# Patient Record
Sex: FEMALE | Race: WHITE | NOT HISPANIC OR LATINO | Employment: FULL TIME | URBAN - METROPOLITAN AREA
[De-identification: names, ages, dates, MRNs, and addresses within clinical notes are randomized per-mention and may not be internally consistent; named-entity substitution may affect disease eponyms.]

---

## 2018-05-30 ENCOUNTER — NON-PROVIDER VISIT (OUTPATIENT)
Dept: OCCUPATIONAL MEDICINE | Facility: CLINIC | Age: 33
End: 2018-05-30

## 2018-05-30 DIAGNOSIS — Z11.1 ENCOUNTER FOR PPD TEST: ICD-10-CM

## 2018-05-30 PROCEDURE — 86580 TB INTRADERMAL TEST: CPT | Performed by: PREVENTIVE MEDICINE

## 2018-06-02 ENCOUNTER — NON-PROVIDER VISIT (OUTPATIENT)
Dept: URGENT CARE | Facility: CLINIC | Age: 33
End: 2018-06-02

## 2018-06-02 LAB — TB WHEAL 3D P 5 TU DIAM: NORMAL MM

## 2018-06-04 ENCOUNTER — OFFICE VISIT (OUTPATIENT)
Dept: OCCUPATIONAL MEDICINE | Facility: CLINIC | Age: 33
End: 2018-06-04

## 2018-06-04 VITALS
HEIGHT: 63 IN | OXYGEN SATURATION: 98 % | DIASTOLIC BLOOD PRESSURE: 68 MMHG | TEMPERATURE: 97.9 F | RESPIRATION RATE: 14 BRPM | WEIGHT: 116 LBS | BODY MASS INDEX: 20.55 KG/M2 | HEART RATE: 72 BPM | SYSTOLIC BLOOD PRESSURE: 100 MMHG

## 2018-06-04 DIAGNOSIS — Z02.89 VISIT FOR OCCUPATIONAL HEALTH EXAMINATION: ICD-10-CM

## 2018-06-04 PROCEDURE — 8915 PR COMPREHENSIVE PHYSICAL: Performed by: PREVENTIVE MEDICINE

## 2018-06-26 ENCOUNTER — NON-PROVIDER VISIT (OUTPATIENT)
Dept: OCCUPATIONAL MEDICINE | Facility: CLINIC | Age: 33
End: 2018-06-26

## 2018-06-26 DIAGNOSIS — Z23 NEED FOR HEPATITIS B VACCINATION: ICD-10-CM

## 2018-06-26 PROCEDURE — 90746 HEPB VACCINE 3 DOSE ADULT IM: CPT | Performed by: PREVENTIVE MEDICINE

## 2019-02-11 ENCOUNTER — TELEPHONE (OUTPATIENT)
Dept: SCHEDULING | Facility: IMAGING CENTER | Age: 34
End: 2019-02-11

## 2019-02-11 ENCOUNTER — OFFICE VISIT (OUTPATIENT)
Dept: INTERNAL MEDICINE | Facility: MEDICAL CENTER | Age: 34
End: 2019-02-11
Payer: COMMERCIAL

## 2019-02-11 VITALS
SYSTOLIC BLOOD PRESSURE: 100 MMHG | HEIGHT: 63 IN | WEIGHT: 129.38 LBS | DIASTOLIC BLOOD PRESSURE: 66 MMHG | BODY MASS INDEX: 22.93 KG/M2 | TEMPERATURE: 98.5 F | HEART RATE: 69 BPM | OXYGEN SATURATION: 97 %

## 2019-02-11 DIAGNOSIS — G89.29 CHRONIC PELVIC PAIN IN FEMALE: ICD-10-CM

## 2019-02-11 DIAGNOSIS — N30.90 CYSTITIS: ICD-10-CM

## 2019-02-11 DIAGNOSIS — R10.2 CHRONIC PELVIC PAIN IN FEMALE: ICD-10-CM

## 2019-02-11 DIAGNOSIS — Z86.19 HISTORY OF HERPES GENITALIS: ICD-10-CM

## 2019-02-11 DIAGNOSIS — R21 SKIN RASH: ICD-10-CM

## 2019-02-11 PROCEDURE — 99204 OFFICE O/P NEW MOD 45 MIN: CPT | Mod: GC | Performed by: INTERNAL MEDICINE

## 2019-02-11 RX ORDER — ACYCLOVIR 400 MG/1
400 TABLET ORAL 3 TIMES DAILY
Qty: 21 TAB | Refills: 1 | Status: SHIPPED | OUTPATIENT
Start: 2019-02-11 | End: 2019-02-11

## 2019-02-11 RX ORDER — ACYCLOVIR 400 MG/1
400 TABLET ORAL 3 TIMES DAILY
Qty: 21 TAB | Refills: 1 | Status: SHIPPED | OUTPATIENT
Start: 2019-02-11 | End: 2019-02-18

## 2019-02-11 RX ORDER — PERMETHRIN 50 MG/G
30 CREAM TOPICAL ONCE
Qty: 1 TUBE | Refills: 1 | Status: SHIPPED | OUTPATIENT
Start: 2019-02-11 | End: 2019-02-11

## 2019-02-11 ASSESSMENT — PATIENT HEALTH QUESTIONNAIRE - PHQ9: CLINICAL INTERPRETATION OF PHQ2 SCORE: 0

## 2019-02-11 NOTE — LETTER
[facility]  Shana Douglas M.D.  [address] [facility] [address]  [place] [address]  Fax: [phone]   Authorization for Release/Disclosure of   Protected Health Information   Name: NEENA VAIL : 1985 SSN: [id number]   Address: [address] [place] [address], [address] [address] Phone:    [phone] (home)    I authorize the entity listed below to release/disclose the PHI below to:   [facility]/Shana Douglas M.D. and Shana Douglas M.D.   Provider or Entity Name: [facility]     Address                             [address] [place] [address], [place], [place]   Phone:      Fax:     Reason for request: continuity of care   Information to be released:    [  ] LAST COLONOSCOPY,  including any PATH REPORT and follow-up  [  ] LAST FIT/COLOGUARD RESULT [  ] LAST DEXA  [  ] LAST MAMMOGRAM  [ X ] LAST PAP  [  ] LAST LABS [  ] RETINA EXAM REPORT  [  ] IMMUNIZATION RECORDS  [  ] Release all info      [  ] Check here and initial the line next to each item to release ALL health information INCLUDING  _____ Care and treatment for drug and / or alcohol abuse  _____ HIV testing, infection status, or AIDS  _____ Genetic Testing    DATES OF SERVICE OR TIME PERIOD TO BE DISCLOSED: _____________  I understand and acknowledge that:  * This Authorization may be revoked at any time by you in writing, except if your health information has already been used or disclosed.  * Your health information that will be used or disclosed as a result of you signing this authorization could be re-disclosed by the recipient. If this occurs, your re-disclosed health information may no longer be protected by State or Federal laws.  * You may refuse to sign this Authorization. Your refusal will not affect your ability to obtain treatment.  * This Authorization becomes effective upon signing and will  on (date) __________.      If no date is indicated, this Authorization will  one (1) year from the signature date.       Name: Madelyn Jessica    Signature:   Date:     2/11/2019       PLEASE FAX REQUESTED RECORDS BACK TO: [phone]

## 2019-02-11 NOTE — LETTER
[facility]  Shana Douglas M.D.  [address] [facility] [address]  [place] [address]  Fax: [phone]   Authorization for Release/Disclosure of   Protected Health Information   Name: NEENA VAIL : 1985 SSN: [id number]   Address: [address] [place] [address] Phone:    [phone] (home)    I authorize the entity listed below to release/disclose the PHI below to:   [facility]/Shana Douglas M.D. and Shana Douglas M.D.   Provider or Entity Name: [facility]   City, State, [facility]   Phone:      Fax:     Reason for request: continuity of care   Information to be released:    [  ] LAST COLONOSCOPY,  including any PATH REPORT and follow-up  [  ] LAST FIT/COLOGUARD RESULT [  ] LAST DEXA  [  ] LAST MAMMOGRAM  [  ] LAST PAP  [  ] LAST LABS [  ] RETINA EXAM REPORT  [  ] IMMUNIZATION RECORDS  [ X ] Release all info      [  ] Check here and initial the line next to each item to release ALL health information INCLUDING  _____ Care and treatment for drug and / or alcohol abuse  _____ HIV testing, infection status, or AIDS  _____ Genetic Testing    DATES OF SERVICE OR TIME PERIOD TO BE DISCLOSED: _____________  I understand and acknowledge that:  * This Authorization may be revoked at any time by you in writing, except if your health information has already been used or disclosed.  * Your health information that will be used or disclosed as a result of you signing this authorization could be re-disclosed by the recipient. If this occurs, your re-disclosed health information may no longer be protected by State or Federal laws.  * You may refuse to sign this Authorization. Your refusal will not affect your ability to obtain treatment.  * This Authorization becomes effective upon signing and will  on (date) __________.      If no date is indicated, this Authorization will  one (1) year from the signature date.    Name: Neena Vail    Signature:   Date:      2/11/2019       PLEASE FAX REQUESTED RECORDS BACK TO: [phone]

## 2019-02-12 NOTE — PROGRESS NOTES
New Patient to Establish    Reason to establish: New patient to establish    CC:   Chief Complaint   Patient presents with   • New Patient   • Other     Pt has been exposed Scabies, MRSA, and contact dermtaologist.   • Rash     Pt not sure if she has contact dematitis.   • Itching   • Patient Question     Per pt has had Herpes 1 & 2. Not sure if she needs to be on Medicine       HPI:     33-year-old female came in for above.  Her previous PCP at [facility].    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  The facility went through the protocol to clean scabies and she received prophylaxis for her exposure.  She is really diligent with PPE.  However, she noticed itchiness and red dots on her left forearm and on her belly 2 weeks ago.  Those were intermittently itchy, not specifically during the night.  She has sensitive skin but denies any change in her personal product use.  She is taking hydroxyzine for itchiness. Her facility thinks that she's having allergies or contact dermatitis. She lives with a boyfriend.     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  She got STD screening including chlamydia, gonorrhea, HIV, HSV, [facility] smear recently at [facility], said to be negative except for HSV 1 and 2 which came back positive.  She is wondering if she needs prophylactic treatment for HSV infection.  She had a history of breakouts where she has sensitivity and ? Ulcer which went away on its own. Breakouts happens every 6 months.    She has history of ovarian cyst rupture and had laparoscopic removal.  Since then, she has chronic pelvic pain, urinary frequency, her GYN thought that she possibly has interstitial cystitis or endometriosis.  Her menstrual periods are regular.  She normally urinates 20 times a day, but currently denies dysuria, fever or chills. She has loose  BMs 1-2 times a day. No weight loss.  In fact, she gained about 30 pounds.    ROS:   All other systems reviewed, negative except as stated above.    Patient Active Problem List    Diagnosis Date Noted   • history of interstitial Cystitis 02/11/2019   • Chronic pelvic pain in female 02/11/2019   • History of herpes genitalis 02/11/2019       Past Medical History:   Diagnosis Date   • Bronchitis    • COPD     chronic bronchitis   • Infectious disease 2007    MRSA abcesses   • Other specified symptom associated with female genital organs     bacterial infections   • Pain     jaw   • Ruptured ovarian cyst 4/23/2014       Current Outpatient Prescriptions   Medication Sig Dispense Refill   • permethrin (ELIMITE) 5 % Cream Apply 30 g to affected area(s) Once for 1 dose. 1 Tube 1   • acyclovir (ZOVIRAX) 400 MG tablet Take 1 Tab by mouth 3 times a day for 7 days. At the first sign of herpes break out (burning / sensitivity / vesicles) 21 Tab 1   • hydrOXYzine (ATARAX) 25 MG Tab Take 25 mg by mouth 3 times a day as needed for Itching.       No current facility-administered medications for this visit.        Allergies as of 02/11/2019 - Reviewed 02/11/2019   Allergen Reaction Noted   • Flagyl [kdc:metronidazole+tartrazine] Vomiting 12/29/2015   • Vicodin [hydrocodone-acetaminophen]  01/25/2011       Social History     Social History   • Marital status: Single     Spouse name: N/A   • Number of children: N/A   • Years of education: N/A     Occupational History   • Not on file.     Social History Main Topics   • Smoking status: Former Smoker     Packs/day: 0.50     Years: 11.00     Types: Cigarettes     Quit date: 01/2017   • Smokeless tobacco: Never Used      Comment: Pt vaps now   • Alcohol use No   • Drug use: No      Comment: speed and marijuana, 3 years ago   • Sexual activity: Yes     Other Topics Concern   • Not on file     Social History Narrative   • No narrative on file       Family History   Problem Relation Age of  "Onset   • Thyroid Mother         graves   • Heart Disease Father         CHF, valve problem   • Dementia Father    • Alcohol/Drug Father    • Lung Disease Father         COPD   • No Known Problems Sister    • No Known Problems Brother        Past Surgical History:   Procedure Laterality Date   • PELVISCOPY  4/23/2014    Performed by Eileen Cleveland M.D. at SURGERY SAME DAY [facility] ORS   • MANDIBLE FRACTURE ORIF  2/7/2011    Performed by SABI BURT JR at SURGERY SAME DAY [facility] ORS         /66 (BP Location: Left arm, Patient Position: Sitting, BP Cuff Size: Adult)   Pulse 69   Temp 36.9 °C (98.5 °F) (Temporal)   Ht 1.594 m (5' 2.75\")   Wt 58.7 kg (129 lb 6 oz)   SpO2 97%   BMI 23.10 kg/m²     Physical Exam  General: Alert and oriented, No apparent distress.  Eyes: Pupils are equal and reactive. No scleral icterus.  Throat: Clear no erythema or exudates noted.  Neck: Supple. No lymphadenopathy noted. Thyroid not enlarged.  Lungs: Clear to auscultation bilaterally without any wheezing, crepitations.  Cardiovascular: Regular rate and rhythm. No murmurs, rubs or gallops.  Abdomen: Bowel sound +, soft, non tender, no rebound or guarding, no palpable organomegaly  Extremities: No clubbing, cyanosis, edema.  Skin: tiny red bumps on left forearm and abdomen. Unable to see if there is clear opening on top.   Neuro: A & O x 3. Normal speech and memory. Motor and sensory grossly normal.     Note: I have reviewed all pertinent labs and diagnostic tests associated with this visit with specific comments listed under the assessment and plan below    Assessment and Plan    1. Skin rash  tiny red bumps on left forearm and abdomen. Unable to see if there is clear opening on top. There is no erythematous patch. It is not consistent with contact dermatitis. Scabies cannot be ruled out.   - will empirically treat for scabies with permethrin (ELIMITE) 5 % Cream, instruction given for the cream and to wash clothes " and balance at home with hot water and dry with hot dryer. Recommended treatment for boyfriend too, but lack of insurance for him is limiting factor.   - if there is no improvement with permethrin cream, we will look for other causes of her skin rash. Recommended to maintain moisture of skin to prevent itchiness from drying / sensitivities.    2. History of herpes genitalis  Explained that we will treat for the break out symptoms. Since her breakouts are infrequent, she does not need prophylactic treatment.  - acyclovir (ZOVIRAX) 400 MG tablet; Take 1 Tab by mouth 3 times a day for 7 days. At the first sign of herpes break out (burning / sensitivity / vesicles)  Dispense: 21 Tab; Refill: 1    3. history of interstitial Cystitis  - will try to get record from previous gyne if it's still there since she last saw her 8 years ago (Dr Winsome GAY). She declined dietary management (avoidance of spicy food) in the past.   - check URINALYSIS; Future  - CBC WITH DIFFERENTIAL; Future  - Comp Metabolic Panel; Future      4. Chronic pelvic pain in female  - manageable right now since she has it for prolonged period.     PAP - this year at [facility], request record.  Tdap - 2017    Followup: Return in about 5 weeks (around 3/18/2019).      Signed by: Shana Douglas M.D.

## 2019-03-11 ENCOUNTER — HOSPITAL ENCOUNTER (OUTPATIENT)
Dept: LAB | Facility: MEDICAL CENTER | Age: 34
End: 2019-03-11
Attending: INTERNAL MEDICINE
Payer: COMMERCIAL

## 2019-03-11 DIAGNOSIS — N30.90 CYSTITIS: ICD-10-CM

## 2019-03-11 DIAGNOSIS — R21 SKIN RASH: ICD-10-CM

## 2019-03-11 LAB
ALBUMIN SERPL BCP-MCNC: 4.9 G/DL (ref 3.2–4.9)
ALBUMIN/GLOB SERPL: 1.5 G/DL
ALP SERPL-CCNC: 96 U/L (ref 30–99)
ALT SERPL-CCNC: 23 U/L (ref 2–50)
ANION GAP SERPL CALC-SCNC: 11 MMOL/L (ref 0–11.9)
APPEARANCE UR: CLEAR
AST SERPL-CCNC: 21 U/L (ref 12–45)
BACTERIA #/AREA URNS HPF: ABNORMAL /HPF
BASOPHILS # BLD AUTO: 0.3 % (ref 0–1.8)
BASOPHILS # BLD: 0.02 K/UL (ref 0–0.12)
BILIRUB SERPL-MCNC: 0.5 MG/DL (ref 0.1–1.5)
BILIRUB UR QL STRIP.AUTO: NEGATIVE
BUN SERPL-MCNC: 18 MG/DL (ref 8–22)
CALCIUM SERPL-MCNC: 9.6 MG/DL (ref 8.5–10.5)
CHLORIDE SERPL-SCNC: 103 MMOL/L (ref 96–112)
CO2 SERPL-SCNC: 24 MMOL/L (ref 20–33)
COLOR UR: YELLOW
CREAT SERPL-MCNC: 0.61 MG/DL (ref 0.5–1.4)
EOSINOPHIL # BLD AUTO: 0.14 K/UL (ref 0–0.51)
EOSINOPHIL NFR BLD: 1.9 % (ref 0–6.9)
EPI CELLS #/AREA URNS HPF: ABNORMAL /HPF
ERYTHROCYTE [DISTWIDTH] IN BLOOD BY AUTOMATED COUNT: 43 FL (ref 35.9–50)
GLOBULIN SER CALC-MCNC: 3.3 G/DL (ref 1.9–3.5)
GLUCOSE SERPL-MCNC: 69 MG/DL (ref 65–99)
GLUCOSE UR STRIP.AUTO-MCNC: NEGATIVE MG/DL
HCT VFR BLD AUTO: 44.1 % (ref 37–47)
HGB BLD-MCNC: 14.7 G/DL (ref 12–16)
HYALINE CASTS #/AREA URNS LPF: ABNORMAL /LPF
IMM GRANULOCYTES # BLD AUTO: 0.03 K/UL (ref 0–0.11)
IMM GRANULOCYTES NFR BLD AUTO: 0.4 % (ref 0–0.9)
KETONES UR STRIP.AUTO-MCNC: NEGATIVE MG/DL
LEUKOCYTE ESTERASE UR QL STRIP.AUTO: ABNORMAL
LYMPHOCYTES # BLD AUTO: 2.84 K/UL (ref 1–4.8)
LYMPHOCYTES NFR BLD: 39.5 % (ref 22–41)
MCH RBC QN AUTO: 30 PG (ref 27–33)
MCHC RBC AUTO-ENTMCNC: 33.3 G/DL (ref 33.6–35)
MCV RBC AUTO: 90 FL (ref 81.4–97.8)
MICRO URNS: ABNORMAL
MONOCYTES # BLD AUTO: 0.33 K/UL (ref 0–0.85)
MONOCYTES NFR BLD AUTO: 4.6 % (ref 0–13.4)
NEUTROPHILS # BLD AUTO: 3.83 K/UL (ref 2–7.15)
NEUTROPHILS NFR BLD: 53.3 % (ref 44–72)
NITRITE UR QL STRIP.AUTO: NEGATIVE
NRBC # BLD AUTO: 0 K/UL
NRBC BLD-RTO: 0 /100 WBC
PH UR STRIP.AUTO: 6.5 [PH]
PLATELET # BLD AUTO: 337 K/UL (ref 164–446)
PMV BLD AUTO: 10.4 FL (ref 9–12.9)
POTASSIUM SERPL-SCNC: 3.6 MMOL/L (ref 3.6–5.5)
PROT SERPL-MCNC: 8.2 G/DL (ref 6–8.2)
PROT UR QL STRIP: NEGATIVE MG/DL
RBC # BLD AUTO: 4.9 M/UL (ref 4.2–5.4)
RBC # URNS HPF: ABNORMAL /HPF
RBC UR QL AUTO: ABNORMAL
SODIUM SERPL-SCNC: 138 MMOL/L (ref 135–145)
SP GR UR STRIP.AUTO: 1.01
UROBILINOGEN UR STRIP.AUTO-MCNC: 0.2 MG/DL
WBC # BLD AUTO: 7.2 K/UL (ref 4.8–10.8)
WBC #/AREA URNS HPF: ABNORMAL /HPF

## 2019-03-11 PROCEDURE — 81001 URINALYSIS AUTO W/SCOPE: CPT

## 2019-03-11 PROCEDURE — 80053 COMPREHEN METABOLIC PANEL: CPT

## 2019-03-11 PROCEDURE — 85025 COMPLETE CBC W/AUTO DIFF WBC: CPT

## 2019-03-11 PROCEDURE — 36415 COLL VENOUS BLD VENIPUNCTURE: CPT

## 2019-03-12 DIAGNOSIS — R35.0 URINARY FREQUENCY: ICD-10-CM

## 2019-03-18 ENCOUNTER — HOSPITAL ENCOUNTER (OUTPATIENT)
Dept: LAB | Facility: MEDICAL CENTER | Age: 34
End: 2019-03-18
Attending: INTERNAL MEDICINE
Payer: COMMERCIAL

## 2019-03-18 ENCOUNTER — OFFICE VISIT (OUTPATIENT)
Dept: INTERNAL MEDICINE | Facility: MEDICAL CENTER | Age: 34
End: 2019-03-18
Payer: COMMERCIAL

## 2019-03-18 VITALS
HEIGHT: 63 IN | BODY MASS INDEX: 23.21 KG/M2 | HEART RATE: 73 BPM | TEMPERATURE: 97.9 F | WEIGHT: 131 LBS | OXYGEN SATURATION: 97 % | SYSTOLIC BLOOD PRESSURE: 114 MMHG | DIASTOLIC BLOOD PRESSURE: 76 MMHG

## 2019-03-18 DIAGNOSIS — N30.90 CYSTITIS: ICD-10-CM

## 2019-03-18 DIAGNOSIS — R35.0 URINARY FREQUENCY: ICD-10-CM

## 2019-03-18 DIAGNOSIS — R10.2 CHRONIC PELVIC PAIN IN FEMALE: ICD-10-CM

## 2019-03-18 DIAGNOSIS — G89.29 CHRONIC PELVIC PAIN IN FEMALE: ICD-10-CM

## 2019-03-18 LAB
APPEARANCE UR: CLEAR
BILIRUB UR QL STRIP.AUTO: NEGATIVE
COLOR UR: YELLOW
GLUCOSE UR STRIP.AUTO-MCNC: NEGATIVE MG/DL
KETONES UR STRIP.AUTO-MCNC: NEGATIVE MG/DL
LEUKOCYTE ESTERASE UR QL STRIP.AUTO: NEGATIVE
MICRO URNS: NORMAL
NITRITE UR QL STRIP.AUTO: NEGATIVE
PH UR STRIP.AUTO: 6 [PH]
PROT UR QL STRIP: NEGATIVE MG/DL
RBC UR QL AUTO: NEGATIVE
SP GR UR STRIP.AUTO: 1.02
UROBILINOGEN UR STRIP.AUTO-MCNC: 0.2 MG/DL

## 2019-03-18 PROCEDURE — 99213 OFFICE O/P EST LOW 20 MIN: CPT | Mod: GE | Performed by: INTERNAL MEDICINE

## 2019-03-18 PROCEDURE — 81003 URINALYSIS AUTO W/O SCOPE: CPT

## 2019-03-18 RX ORDER — PERMETHRIN 50 MG/G
CREAM TOPICAL
Refills: 1 | COMMUNITY
Start: 2019-02-11 | End: 2019-03-18

## 2019-03-18 RX ORDER — ACYCLOVIR 400 MG/1
TABLET ORAL
COMMUNITY
Start: 2019-03-13 | End: 2020-04-28

## 2019-03-18 NOTE — LETTER
[facility]  Shana Douglas M.D.  [address] [facility] [address]  [place] [address]  Fax: [phone]   Authorization for Release/Disclosure of   Protected Health Information   Name: NEENA VAIL : 1985 SSN: [id number]   Address: [address] [place] [address] [place] [address] Phone:    [phone] (home)    I authorize the entity listed below to release/disclose the PHI below to:   [facility]/Shana Douglas M.D. and Shana Douglas M.D.   Provider or Entity Name:                                                 Dr. Eileen ISLAS     Address   [facility]   Phone:      Fax:     Reason for request: continuity of care   Information to be released:    [  ] LAST COLONOSCOPY,  including any PATH REPORT and follow-up  [  ] LAST FIT/COLOGUARD RESULT [  ] LAST DEXA  [  ] LAST MAMMOGRAM  [  ] LAST PAP  [  ] LAST LABS [  ] RETINA EXAM REPORT  [  ] IMMUNIZATION RECORDS  [X] Release all info      [  ] Check here and initial the line next to each item to release ALL health information INCLUDING  _____ Care and treatment for drug and / or alcohol abuse  _____ HIV testing, infection status, or AIDS  _____ Genetic Testing    DATES OF SERVICE OR TIME PERIOD TO BE DISCLOSED: _____________  I understand and acknowledge that:  * This Authorization may be revoked at any time by you in writing, except if your health information has already been used or disclosed.  * Your health information that will be used or disclosed as a result of you signing this authorization could be re-disclosed by the recipient. If this occurs, your re-disclosed health information may no longer be protected by State or Federal laws.  * You may refuse to sign this Authorization. Your refusal will not affect your ability to obtain treatment.  * This Authorization becomes effective upon signing and will  on (date) __________.      If no date is indicated, this Authorization will  one (1) year from the signature date.    Name: Neena  Chyna Jessica    Signature:   Date:     3/18/2019       PLEASE FAX REQUESTED RECORDS BACK TO: [phone]

## 2019-03-18 NOTE — LETTER
[facility]  Shana Douglas M.D.  [address] [facility] [address]  [place] [address]  Fax: [phone]   Authorization for Release/Disclosure of   Protected Health Information   Name: NEENA VAIL : 1985 SSN: [id number]   Address: [address] [place] [address] Phone:    [phone] (home)    I authorize the entity listed below to release/disclose the PHI below to:   [facility]/Shana Douglas M.D. and Shana Douglas M.D.   Provider or Entity Name:                                                      [facility]     Address   City, State, Zip   Phone:      Fax:     Reason for request: continuity of care   Information to be released:    [  ] LAST COLONOSCOPY,  including any PATH REPORT and follow-up  [  ] LAST FIT/COLOGUARD RESULT [  ] LAST DEXA  [  ] LAST MAMMOGRAM  [X] LAST PAP  [  ] LAST LABS [  ] RETINA EXAM REPORT  [  ] IMMUNIZATION RECORDS  [X] Release all info      [  ] Check here and initial the line next to each item to release ALL health information INCLUDING  _____ Care and treatment for drug and / or alcohol abuse  _____ HIV testing, infection status, or AIDS  _____ Genetic Testing    DATES OF SERVICE OR TIME PERIOD TO BE DISCLOSED: _____________  I understand and acknowledge that:  * This Authorization may be revoked at any time by you in writing, except if your health information has already been used or disclosed.  * Your health information that will be used or disclosed as a result of you signing this authorization could be re-disclosed by the recipient. If this occurs, your re-disclosed health information may no longer be protected by State or Federal laws.  * You may refuse to sign this Authorization. Your refusal will not affect your ability to obtain treatment.  * This Authorization becomes effective upon signing and will  on (date) __________.      If no date is indicated, this Authorization will  one (1) year from the signature date.    Name: Neena Clemente  Aracely    Signature:   Date:     3/18/2019       PLEASE FAX REQUESTED RECORDS BACK TO: [phone]

## 2019-03-18 NOTE — PROGRESS NOTES
"    Established Patient    Madelyn presents today with the following:    CC:   Chief Complaint   Patient presents with   • Labs Only     Lab Review       HPI:   33-year-old female came in for follow-up for lab review and chronic medical problems.    She was seen here for possible scabies with intense skin itchiness, resolved after applying permethrin.     She continues to have urinary frequency without dysuria, flank pain, fever or chills.   This has been ongoing along with chronic pelvic pain since her right ovarian cystectomy.  She denies any gross hematuria, but was diagnosed with interstitial cystitis clinically by her gynecologist, Dr. Cleveland.  Her pain is exacerbated by certain food such as spicy food and caffeine, also gets worse with bowel movement occasionally.   She is taking ibuprofen for pain with improvement.  This is not daily pain, but can fluctuate quite a bit from 0-8.   Her menstrual periods are regular, once a month, flow varies from scant to heavy.     She reportedly had a transabdominal and transvaginal ultrasound last year, said to be normal except for urinary retention after she tried to empty the bladder completely.      ROS:   All other systems reviewed, negative except as stated above.    Patient Active Problem List    Diagnosis Date Noted   • Urinary frequency 03/18/2019   • history of interstitial Cystitis 02/11/2019   • Chronic pelvic pain in female 02/11/2019   • History of herpes genitalis 02/11/2019       Current Outpatient Prescriptions   Medication Sig Dispense Refill   • acyclovir (ZOVIRAX) 400 MG tablet      • hydrOXYzine (ATARAX) 25 MG Tab Take 25 mg by mouth 3 times a day as needed for Itching.       No current facility-administered medications for this visit.          /76 (BP Location: Right arm, Patient Position: Sitting, BP Cuff Size: Adult)   Pulse 73   Temp 36.6 °C (97.9 °F) (Temporal)   Ht 1.594 m (5' 2.75\")   Wt 59.4 kg (131 lb)   LMP 03/04/2019   SpO2 97%   " Breastfeeding? No   BMI 23.39 kg/m²     Physical Exam  General: Alert and oriented, No apparent distress.  Eyes: Pupils are equal and reactive. No scleral icterus.  Throat: Clear no erythema or exudates noted.  Neck: Supple. No lymphadenopathy noted. Thyroid not enlarged.  Lungs: Clear to auscultation bilaterally without any wheezing, crepitations.  Cardiovascular: Regular rate and rhythm. No murmurs, rubs or gallops.  Abdomen: Bowel sound +, soft, non tender, no rebound or guarding, no palpable organomegaly  Extremities: No clubbing, cyanosis, edema.  Skin: No rash or suspicious skin lesions noted.  Neuro: A & O x 3. Normal speech and memory. Motor and sensory grossly normal.     Note: I have reviewed all pertinent labs and diagnostic tests associated with this visit with specific comments listed under the assessment and plan below    Assessment and Plan    1. Chronic pelvic pain in female  We will request US pelvic result. Since it's said to be normal, this is likely related to her previous ovarian surgery and scarring causing pain. There is a possibility she may have pelvic floor muscle dysfunction and she may benefit from pelvic floor physical therapy.   - She is willing to try physical therapy for pelvic floor muscle.  - REFERRAL TO PHYSICAL THERAPY Reason for Therapy: Eval/Treat/Report  -She is going to make an appointment with her GYN for menstrual problem.  She will let me know if she needs a referral. She is not on contraception with her bf since she is willing to conceive.    2. history of interstitial Cystitis  3. Urinary frequency  Last UA showed small Blood without any significant RBCs, trace leukocyte esterase, epithelial cells, bacteria, hyaline cast.  This is likely contaminated.  She denies any change in symptoms.  Her urinary frequency has been chronic for years.  - REFERRAL TO UROLOGY for urinary frequency disturbing with her daily lifestyle and urinary retention.    [facility] at [facility], requested record.  Tdap - 2017    Return in about 6 months (around 9/18/2019).    Signed by: Shana Douglas M.D.

## 2019-09-29 ENCOUNTER — HOSPITAL ENCOUNTER (EMERGENCY)
Facility: MEDICAL CENTER | Age: 34
End: 2019-09-29
Attending: EMERGENCY MEDICINE
Payer: COMMERCIAL

## 2019-09-29 VITALS
WEIGHT: 131.84 LBS | TEMPERATURE: 97.9 F | RESPIRATION RATE: 18 BRPM | SYSTOLIC BLOOD PRESSURE: 117 MMHG | OXYGEN SATURATION: 98 % | DIASTOLIC BLOOD PRESSURE: 81 MMHG | HEIGHT: 63 IN | HEART RATE: 68 BPM | BODY MASS INDEX: 23.36 KG/M2

## 2019-09-29 DIAGNOSIS — G89.18 POST-OPERATIVE PAIN: ICD-10-CM

## 2019-09-29 DIAGNOSIS — K08.89 PAIN, DENTAL: ICD-10-CM

## 2019-09-29 PROCEDURE — A9270 NON-COVERED ITEM OR SERVICE: HCPCS | Performed by: EMERGENCY MEDICINE

## 2019-09-29 PROCEDURE — 700102 HCHG RX REV CODE 250 W/ 637 OVERRIDE(OP): Performed by: EMERGENCY MEDICINE

## 2019-09-29 PROCEDURE — 99283 EMERGENCY DEPT VISIT LOW MDM: CPT

## 2019-09-29 RX ORDER — IBUPROFEN 800 MG/1
800 TABLET ORAL EVERY 8 HOURS PRN
COMMUNITY
End: 2020-04-28

## 2019-09-29 RX ORDER — OXYCODONE HYDROCHLORIDE AND ACETAMINOPHEN 5; 325 MG/1; MG/1
1 TABLET ORAL EVERY 6 HOURS PRN
Qty: 12 TAB | Refills: 0 | Status: SHIPPED | OUTPATIENT
Start: 2019-09-29 | End: 2019-10-03

## 2019-09-29 RX ORDER — CLINDAMYCIN HYDROCHLORIDE 300 MG/1
300 CAPSULE ORAL 4 TIMES DAILY
Qty: 8 CAP | Refills: 0 | Status: SHIPPED | OUTPATIENT
Start: 2019-09-29 | End: 2019-10-01

## 2019-09-29 RX ORDER — CLINDAMYCIN HYDROCHLORIDE 150 MG/1
150 CAPSULE ORAL 4 TIMES DAILY
COMMUNITY
End: 2019-09-29 | Stop reason: SDUPTHER

## 2019-09-29 RX ORDER — CLINDAMYCIN HYDROCHLORIDE 150 MG/1
150 CAPSULE ORAL 4 TIMES DAILY
Qty: 8 CAP | Refills: 0 | Status: SHIPPED | OUTPATIENT
Start: 2019-09-29 | End: 2020-04-28

## 2019-09-29 RX ORDER — OXYCODONE HYDROCHLORIDE AND ACETAMINOPHEN 5; 325 MG/1; MG/1
1 TABLET ORAL ONCE
Status: COMPLETED | OUTPATIENT
Start: 2019-09-29 | End: 2019-09-29

## 2019-09-29 RX ADMIN — OXYCODONE HYDROCHLORIDE AND ACETAMINOPHEN 1 TABLET: 5; 325 TABLET ORAL at 10:21

## 2019-09-29 ASSESSMENT — ENCOUNTER SYMPTOMS
COUGH: 0
HEADACHES: 1
VOMITING: 0
FEVER: 0

## 2019-09-29 NOTE — ED TRIAGE NOTES
"Chief Complaint   Patient presents with   • Dental Pain     had 4 teeth pulled on monday. went back to dentist on Friday and they said it was healing fine. is still having LT sided dental pain. taking clindamycin.      Pt to triage for above. Also taking 800mg ibuprofen, last taken at 0200.  Denies fevers.    Pt returned to lobby. Educated on triage process and to inform staff of any changes.     /88   Pulse 67   Temp 36.6 °C (97.9 °F) (Temporal)   Resp 16   Ht 1.6 m (5' 3\")   Wt 59.8 kg (131 lb 13.4 oz)   SpO2 100%   BMI 23.35 kg/m²     "

## 2019-09-29 NOTE — ED NOTES
Pt discharged with instructions and script  X 2 .  Pt verbalizes the understanding of instructions. Pt ambulated out of ER without any difficulty.

## 2019-09-29 NOTE — ED PROVIDER NOTES
"ED Provider Note    Scribed for Meaghan Adan D.O. by Esperanza Roblero. 9/29/2019, 9:46 AM.    Primary care provider: Shana Douglas M.D.  Means of arrival: Walk-In  History obtained from: Patient  History limited by: None     CHIEF COMPLAINT  Chief Complaint   Patient presents with   • Dental Pain     had 4 teeth pulled on monday. went back to dentist on Friday and they said it was healing fine. is still having LT sided dental pain. taking clindamycin.      HPI  Madelyn Jessica is a 34 y.o. female who presents to the Emergency Department for worsening bottom left dental pain onset last Monday after having 4 teeth removed, 2 on the top and 2 on the bottom. The patient states that on the day she had the extraction, she had no pain but the pain started the night after. She called her dentist, was seen by him on Friday, there was some concern for infection at the time of the tooth extraction and she was prescribed clindamycin to prevent infection which the patient started taking 3-4 days ago. The patient continued to have pain therefore followed-up with her dentist on Friday where they stated she was healing fine and she did not a have a dry socket. She describes her dental pain as \"having an ear infection and Strep throat at the same time.\" Dental pain is exacerbated by breathing in cold air. She is not taking pain medications but she is using 800 mg Ibuprofen, as prescribed by her dentist, to manage her pain however she states that this medication is not working for her. Additionally, patient endorses a pounding/throbbing headache. Denies fever.    REVIEW OF SYSTEMS  Review of Systems   Constitutional: Negative for fever.   HENT: Negative for congestion.         Dental pain   Respiratory: Negative for cough.    Gastrointestinal: Negative for vomiting.   Neurological: Positive for headaches.     PAST MEDICAL HISTORY   has a past medical history of Bronchitis, COPD, Infectious disease (2007), Other specified " "symptom associated with female genital organs, Pain, and Ruptured ovarian cyst (2014).    SURGICAL HISTORY   has a past surgical history that includes mandible fracture orif (2011) and pelviscopy (2014).    SOCIAL HISTORY  Social History     Tobacco Use   • Smoking status: Former Smoker     Packs/day: 0.50     Years: 11.00     Pack years: 5.50     Types: Cigarettes     Last attempt to quit: 2017     Years since quittin.7   • Smokeless tobacco: Never Used   • Tobacco comment: Pt vaps now   Substance Use Topics   • Alcohol use: No   • Drug use: No     Types: Intravenous, Inhaled     Comment: speed and marijuana, 3 years ago      Social History     Substance and Sexual Activity   Drug Use No   • Types: Intravenous, Inhaled    Comment: speed and marijuana, 3 years ago       FAMILY HISTORY  Family History   Problem Relation Age of Onset   • Thyroid Mother         graves   • Heart Disease Father         CHF, valve problem   • Dementia Father    • Alcohol/Drug Father    • Lung Disease Father         COPD   • No Known Problems Sister    • No Known Problems Brother        CURRENT MEDICATIONS  Home Medications     Reviewed by Dave Rodriguez R.N. (Registered Nurse) on 19 at 0908  Med List Status: Partial   Medication Last Dose Status   acyclovir (ZOVIRAX) 400 MG tablet  Active   clindamycin (CLEOCIN) 150 MG Cap 2019 Active   hydrOXYzine (ATARAX) 25 MG Tab  Active   ibuprofen (MOTRIN) 800 MG Tab 2019 Active                ALLERGIES  Allergies   Allergen Reactions   • Flagyl [Kdc:Metronidazole+Tartrazine] Vomiting   • Vicodin [Hydrocodone-Acetaminophen]      \"I feel nauseated.\"       PHYSICAL EXAM  VITAL SIGNS: /88   Pulse 67   Temp 36.6 °C (97.9 °F) (Temporal)   Resp 16   Ht 1.6 m (5' 3\")   Wt 59.8 kg (131 lb 13.4 oz)   SpO2 100%   BMI 23.35 kg/m²   Vitals reviewed.    Constitutional: Patient is oriented to person, place, and time. Appears well-developed and well-nourished. Mild " distress.    Head: Normocephalic and atraumatic.   Ears: Tympanic membranes normal bilaterally.   Mouth/Throat: Oropharynx is clear and moist, no exudates. No swelling to the floor of her mouth. Left bottom molars have been  removed. No gum swelling or erythema over the area in question. Tender to palpation to this region. Manages own secretions.  Eyes: Conjunctivae are normal.   Cardiovascular: Normal rate, regular rhythm and normal heart sounds. Normal peripheral pulses.  Pulmonary/Chest: Effort normal and breath sounds normal. No respiratory distress, no wheezes, rhonchi, or rales.  Lymphadenopathy: No cervical adenopathy.   Skin: Skin is warm and dry. No erythema. No pallor.   Psychiatric: Patient has a normal mood and affect.      COURSE & MEDICAL DECISION MAKING  Pertinent Labs & Imaging studies reviewed. (See chart for details)    Obtained and reviewed past medical records from 3/19 for follow up labs at [facility]. Her last ED visit was for cellulitis.     9:46 AM - Patient seen and examined at bedside.  Patient presents with bottom dental pain after tooth extraction on Monday.  The area does not appear to be inflamed.  There is no evidence of abscess post extraction.  The gums are not erythematous but she does have diffuse tenderness to this area.  She is on clindamycin and I recommended 2 additional days she will be provided a prescription for this.  Patient will be treated with Percocet 325 mg. I informed the patient that there is no further intervention necessary in the ED today. I informed the patient to use a warm compress to alleviate the pain. She verbalized her understanding and agrees with discharge.  Patient is advised to follow-up with her dentist this week.  She will be prescribed a short course of Percocet for home as Motrin is not managing her pain.    I reviewed prescription monitoring program for patient's narcotic use before prescribing a scheduled drug.The patient will not drink alcohol  nor drive with prescribed medications. The patient will return for new or worsening symptoms and is stable at the time of discharge.    The patient is referred to a primary physician for blood pressure management, diabetic screening, and for all other preventative health concerns.    In prescribing controlled substances to this patient, I certify that I have obtained and reviewed the medical history of Madelyn Jessica. I have also made a good maury effort to obtain applicable records from other providers who have treated the patient and    I have conducted a physical exam and documented it. I have reviewed Ms. Jessica’s prescription history as maintained by the [place] Prescription Monitoring Program.     I have assessed the patient’s risk for abuse, dependency, and addiction using the validated Opioid Risk Tool available at https://www.mdcalc.com/jejxza-zthh-uulj-ort-narcotic-abuse.     DISPOSITION:  Patient will be discharged home in stable condition.    FOLLOW UP:  [facility], Emergency Dept  [address] [id number] [phone]    If symptoms worsen      please see your dentist on Monday or Tuesdau this week          OUTPATIENT MEDICATIONS:  Discharge Medication List as of 9/29/2019 10:23 AM      START taking these medications    Details   oxyCODONE-acetaminophen (PERCOCET) 5-325 MG Tab Take 1 Tab by mouth every 6 hours as needed for Moderate Pain or Severe Pain for up to 12 doses., Disp-12 Tab, R-0, Print Rx Paper, For 12 doses               FINAL IMPRESSION  1. Pain, dental    2. Post-operative pain          Esperanza MONTEZ ([place]), am scribing for, and in the presence of, Meaghan Adan D.O..    Electronically signed by: Esperanza Roblero ([place]), 9/29/2019    Meaghan MONTEZ D.O. personally performed the services described in this documentation, as scribed by Esperanza Roblero in my presence, and it is both accurate and complete. E.     The note  accurately reflects work and decisions made by me.  Meaghan Adan  9/29/2019  2:30 PM

## 2020-02-11 ENCOUNTER — HOSPITAL ENCOUNTER (EMERGENCY)
Dept: HOSPITAL 8 - ED | Age: 35
Discharge: HOME | End: 2020-02-11
Payer: COMMERCIAL

## 2020-02-11 VITALS — BODY MASS INDEX: 25.23 KG/M2 | HEIGHT: 63 IN | WEIGHT: 142.42 LBS

## 2020-02-11 VITALS — DIASTOLIC BLOOD PRESSURE: 81 MMHG | SYSTOLIC BLOOD PRESSURE: 131 MMHG

## 2020-02-11 DIAGNOSIS — Z87.891: ICD-10-CM

## 2020-02-11 DIAGNOSIS — R10.2: ICD-10-CM

## 2020-02-11 DIAGNOSIS — O03.4: Primary | ICD-10-CM

## 2020-02-11 LAB
ALBUMIN SERPL-MCNC: 4.4 G/DL (ref 3.4–5)
ALP SERPL-CCNC: 106 U/L (ref 45–117)
ALT SERPL-CCNC: 36 U/L (ref 12–78)
ANION GAP SERPL CALC-SCNC: 8 MMOL/L (ref 5–15)
BASOPHILS # BLD AUTO: 0.03 X10^3/UL (ref 0–0.1)
BASOPHILS NFR BLD AUTO: 0 % (ref 0–1)
BILIRUB SERPL-MCNC: 0.1 MG/DL (ref 0.2–1)
CALCIUM SERPL-MCNC: 9.1 MG/DL (ref 8.5–10.1)
CHLORIDE SERPL-SCNC: 110 MMOL/L (ref 98–107)
CREAT SERPL-MCNC: 0.57 MG/DL (ref 0.55–1.02)
CULTURE INDICATED?: NO
EOSINOPHIL # BLD AUTO: 0.23 X10^3/UL (ref 0–0.4)
EOSINOPHIL NFR BLD AUTO: 2 % (ref 1–7)
ERYTHROCYTE [DISTWIDTH] IN BLOOD BY AUTOMATED COUNT: 13.5 % (ref 9.6–15.2)
LYMPHOCYTES # BLD AUTO: 2.84 X10^3/UL (ref 1–3.4)
LYMPHOCYTES NFR BLD AUTO: 29 % (ref 22–44)
MCH RBC QN AUTO: 30.4 PG (ref 27–34.8)
MCHC RBC AUTO-ENTMCNC: 33.9 G/DL (ref 32.4–35.8)
MCV RBC AUTO: 89.9 FL (ref 80–100)
MD: NO
MICROSCOPIC: (no result)
MONOCYTES # BLD AUTO: 0.7 X10^3/UL (ref 0.2–0.8)
MONOCYTES NFR BLD AUTO: 7 % (ref 2–9)
NEUTROPHILS # BLD AUTO: 5.9 X10^3/UL (ref 1.8–6.8)
NEUTROPHILS NFR BLD AUTO: 61 % (ref 42–75)
PLATELET # BLD AUTO: 410 X10^3/UL (ref 130–400)
PMV BLD AUTO: 7.5 FL (ref 7.4–10.4)
PROT SERPL-MCNC: 8.6 G/DL (ref 6.4–8.2)
RBC # BLD AUTO: 4.76 X10^6/UL (ref 3.82–5.3)

## 2020-02-11 PROCEDURE — 36415 COLL VENOUS BLD VENIPUNCTURE: CPT

## 2020-02-11 PROCEDURE — 99284 EMERGENCY DEPT VISIT MOD MDM: CPT

## 2020-02-11 PROCEDURE — 85025 COMPLETE CBC W/AUTO DIFF WBC: CPT

## 2020-02-11 PROCEDURE — 76801 OB US < 14 WKS SINGLE FETUS: CPT

## 2020-02-11 PROCEDURE — 86901 BLOOD TYPING SEROLOGIC RH(D): CPT

## 2020-02-11 PROCEDURE — 84702 CHORIONIC GONADOTROPIN TEST: CPT

## 2020-02-11 PROCEDURE — 81001 URINALYSIS AUTO W/SCOPE: CPT

## 2020-02-11 PROCEDURE — 80053 COMPREHEN METABOLIC PANEL: CPT

## 2020-04-28 ENCOUNTER — INITIAL PRENATAL (OUTPATIENT)
Dept: OBGYN | Facility: CLINIC | Age: 35
End: 2020-04-28

## 2020-04-28 ENCOUNTER — HOSPITAL ENCOUNTER (OUTPATIENT)
Dept: LAB | Facility: MEDICAL CENTER | Age: 35
End: 2020-04-28
Attending: NURSE PRACTITIONER

## 2020-04-28 VITALS
HEIGHT: 63 IN | WEIGHT: 141.1 LBS | DIASTOLIC BLOOD PRESSURE: 56 MMHG | SYSTOLIC BLOOD PRESSURE: 110 MMHG | BODY MASS INDEX: 25 KG/M2

## 2020-04-28 DIAGNOSIS — N93.8 DUB (DYSFUNCTIONAL UTERINE BLEEDING): ICD-10-CM

## 2020-04-28 DIAGNOSIS — N93.8 DUB (DYSFUNCTIONAL UTERINE BLEEDING): Primary | ICD-10-CM

## 2020-04-28 PROBLEM — R35.0 URINARY FREQUENCY: Status: RESOLVED | Noted: 2019-03-18 | Resolved: 2020-04-28

## 2020-04-28 LAB — B-HCG SERPL-ACNC: 203 MIU/ML (ref 0–5)

## 2020-04-28 PROCEDURE — 36415 COLL VENOUS BLD VENIPUNCTURE: CPT

## 2020-04-28 PROCEDURE — 84702 CHORIONIC GONADOTROPIN TEST: CPT

## 2020-04-28 PROCEDURE — 99203 OFFICE O/P NEW LOW 30 MIN: CPT | Performed by: NURSE PRACTITIONER

## 2020-04-28 ASSESSMENT — ENCOUNTER SYMPTOMS
NAUSEA: 1
VOMITING: 1
CONSTIPATION: 1

## 2020-04-28 ASSESSMENT — FIBROSIS 4 INDEX: FIB4 SCORE: 0.43

## 2020-04-28 NOTE — PATIENT INSTRUCTIONS
P:  1.  GC/CT and pap @ [facility].         2.  Prenatal labs @ [facility].        3.  Discussed PNV, diet, and adequate water intake        4.  [facility] packet given        5.  Return to office in 2wks w MD for  scan.          6.  Beta quants ordered.        7.  SAB precautions given.        8.  D/w pt helps for NV, constipation - handouts given.

## 2020-04-28 NOTE — PROGRESS NOTES
today  LMP03/25/2020  PNV Yes started two days ago  Phone #[phone]  Pharmacy verified with patient  C/o  Itching in vaginal area.

## 2020-04-28 NOTE — PROGRESS NOTES
"Subjective:   Madelyn Fernandez is a 35 y.o.   @ EGA: 4w6d CLARKE: Estimated Date of Delivery: 20  per MALACHI who presents for her DUB visit  She c/o NV, also some constipation.  Reports no FM, VB, LOF, or cramping.  Denies dysuria, vaginal DC.  Pt is  and lives with . Not presently working at this time.  Pregnancy is unplanned but wanted.       Gynecologic Exam (DUB)            HPI    Review of Systems   Gastrointestinal: Positive for constipation, nausea and vomiting.   All other systems reviewed and are negative.         Objective:     /56   Ht 1.6 m (5' 3\")   Wt 64 kg (141 lb 1.6 oz)   LMP 2020 (Exact Date)   BMI 24.99 kg/m²     See H&P Prenatal Physical.          FHTs: unable to auscultate due to early GA        Fundal ht: 5     Physical Exam  Vitals signs and nursing note reviewed.   Constitutional:       Appearance: She is well-developed and normal weight.   HENT:      Head: Normocephalic and atraumatic.   Neck:      Musculoskeletal: Normal range of motion and neck supple.      Thyroid: No thyromegaly.   Cardiovascular:      Rate and Rhythm: Normal rate and regular rhythm.      Pulses: Normal pulses.      Heart sounds: Normal heart sounds.   Pulmonary:      Effort: Pulmonary effort is normal.      Breath sounds: Normal breath sounds.   Chest:      Comments: Deferred  Abdominal:      General: Abdomen is flat. Bowel sounds are normal.      Palpations: Abdomen is soft.   Genitourinary:     Comments: Deferred  Musculoskeletal: Normal range of motion.   Skin:     General: Skin is warm and dry.      Capillary Refill: Capillary refill takes less than 2 seconds.   Neurological:      Mental Status: She is alert and oriented to person, place, and time.   Psychiatric:         Mood and Affect: Mood normal.         Behavior: Behavior normal.         Thought Content: Thought content normal.         Judgment: Judgment normal.               A:   1.  IUP @ 4w6d CLARKE: " Estimated Date of Delivery: 12/30/20 per LNMP         2.  S=D        3.    Patient Active Problem List    Diagnosis Date Noted   • DUB (dysfunctional uterine bleeding) 04/28/2020   • history of interstitial Cystitis 02/11/2019   • Chronic pelvic pain in female 02/11/2019   • History of herpes genitalis 02/11/2019         P:  1.  GC/CT and pap @ [facility].         2.  Prenatal labs @ [facility].        3.  Discussed PNV, diet, and adequate water intake        4.  NOB packet given        5.  Return to office in 2wks w MD for  scan.          6.  Beta quants ordered.        7.  SAB precautions given.        8.  D/w pt helps for NV, constipation - handouts given.

## 2020-04-29 ENCOUNTER — TELEPHONE (OUTPATIENT)
Dept: OBGYN | Facility: CLINIC | Age: 35
End: 2020-04-29

## 2020-04-29 NOTE — TELEPHONE ENCOUNTER
----- Message from FILIBERTO Jacobson sent at 4/29/2020  8:49 AM PDT -----  Please insure pt does 2nd beta quant.    4/29/20 1531 Spoke with pt and notified, she will go to lab tomorrow

## 2020-04-29 NOTE — TELEPHONE ENCOUNTER
Pt was calling regarding beta quants. I did inform she needs to do the 2nd quant on Thursday to determine if it is a normal or abnormal pregnancy. Pt asked if we can give her hormones to increase the beat quants. I did explain that they will go up naturally. Pt understood, no further questions and will call on Friday.

## 2020-04-30 ENCOUNTER — HOSPITAL ENCOUNTER (OUTPATIENT)
Dept: LAB | Facility: MEDICAL CENTER | Age: 35
End: 2020-04-30
Attending: NURSE PRACTITIONER

## 2020-04-30 DIAGNOSIS — N93.8 DUB (DYSFUNCTIONAL UTERINE BLEEDING): ICD-10-CM

## 2020-04-30 PROCEDURE — 84702 CHORIONIC GONADOTROPIN TEST: CPT

## 2020-04-30 PROCEDURE — 36415 COLL VENOUS BLD VENIPUNCTURE: CPT

## 2020-05-01 LAB — B-HCG SERPL-ACNC: 287 MIU/ML (ref 0–5)

## 2020-05-07 ENCOUNTER — TELEPHONE (OUTPATIENT)
Dept: OBGYN | Facility: CLINIC | Age: 35
End: 2020-05-07

## 2020-05-07 NOTE — TELEPHONE ENCOUNTER
----- Message from FILIBERTO Jacobson sent at 5/3/2020  7:29 AM PDT -----  Beta quants are rising but not doubling.   Please schedule pt w MDs ASAP for  US.    5/7/20 0956 Pt called inquiring about results. Pt informed of above and has  scheduled for 5/18/20

## 2020-05-12 ENCOUNTER — HOSPITAL ENCOUNTER (EMERGENCY)
Dept: HOSPITAL 8 - ED | Age: 35
Discharge: HOME | End: 2020-05-12
Payer: COMMERCIAL

## 2020-05-12 VITALS — SYSTOLIC BLOOD PRESSURE: 112 MMHG | DIASTOLIC BLOOD PRESSURE: 74 MMHG

## 2020-05-12 VITALS — WEIGHT: 143.3 LBS | BODY MASS INDEX: 25.39 KG/M2 | HEIGHT: 63 IN

## 2020-05-12 DIAGNOSIS — O26.891: Primary | ICD-10-CM

## 2020-05-12 DIAGNOSIS — Z3A.01: ICD-10-CM

## 2020-05-12 LAB
ALBUMIN SERPL-MCNC: 3.9 G/DL (ref 3.4–5)
ANION GAP SERPL CALC-SCNC: 8 MMOL/L (ref 5–15)
BASOPHILS # BLD AUTO: 0.05 X10^3/UL (ref 0–0.1)
BASOPHILS NFR BLD AUTO: 1 % (ref 0–1)
CALCIUM SERPL-MCNC: 8.9 MG/DL (ref 8.5–10.1)
CHLORIDE SERPL-SCNC: 108 MMOL/L (ref 98–107)
CREAT SERPL-MCNC: 0.59 MG/DL (ref 0.55–1.02)
CULTURE INDICATED?: NO
EOSINOPHIL # BLD AUTO: 0.1 X10^3/UL (ref 0–0.4)
EOSINOPHIL NFR BLD AUTO: 1 % (ref 1–7)
ERYTHROCYTE [DISTWIDTH] IN BLOOD BY AUTOMATED COUNT: 14.1 % (ref 9.6–15.2)
LYMPHOCYTES # BLD AUTO: 1.92 X10^3/UL (ref 1–3.4)
LYMPHOCYTES NFR BLD AUTO: 22 % (ref 22–44)
MCH RBC QN AUTO: 30.5 PG (ref 27–34.8)
MCHC RBC AUTO-ENTMCNC: 34.2 G/DL (ref 32.4–35.8)
MCV RBC AUTO: 89.1 FL (ref 80–100)
MD: NO
MICROSCOPIC: (no result)
MONOCYTES # BLD AUTO: 0.57 X10^3/UL (ref 0.2–0.8)
MONOCYTES NFR BLD AUTO: 6 % (ref 2–9)
NEUTROPHILS # BLD AUTO: 6.24 X10^3/UL (ref 1.8–6.8)
NEUTROPHILS NFR BLD AUTO: 70 % (ref 42–75)
PLATELET # BLD AUTO: 431 X10^3/UL (ref 130–400)
PMV BLD AUTO: 7.6 FL (ref 7.4–10.4)
RBC # BLD AUTO: 4.34 X10^6/UL (ref 3.82–5.3)

## 2020-05-12 PROCEDURE — 84702 CHORIONIC GONADOTROPIN TEST: CPT

## 2020-05-12 PROCEDURE — 36415 COLL VENOUS BLD VENIPUNCTURE: CPT

## 2020-05-12 PROCEDURE — 82040 ASSAY OF SERUM ALBUMIN: CPT

## 2020-05-12 PROCEDURE — 86901 BLOOD TYPING SEROLOGIC RH(D): CPT

## 2020-05-12 PROCEDURE — 80048 BASIC METABOLIC PNL TOTAL CA: CPT

## 2020-05-12 PROCEDURE — 85025 COMPLETE CBC W/AUTO DIFF WBC: CPT

## 2020-05-12 PROCEDURE — 81003 URINALYSIS AUTO W/O SCOPE: CPT

## 2020-05-12 PROCEDURE — 99284 EMERGENCY DEPT VISIT MOD MDM: CPT

## 2020-05-12 PROCEDURE — 76801 OB US < 14 WKS SINGLE FETUS: CPT
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
## 2020-05-12 NOTE — NUR
PT DC HOME IN A STABLE CONDITION. DC INSTRUCTIONS WERE DISCUSSED WITH PT. PT 
VERBALIZED UNDERSTANDING. NO FURTHER QUESTIONS OR CONCERNS EXPRESSED AT THAT 
TIME. PT AMBULATED WITH RN TO DC DESK. STEADY GAIT.

## 2020-05-17 ENCOUNTER — HOSPITAL ENCOUNTER (OUTPATIENT)
Dept: HOSPITAL 8 - OR | Age: 35
Discharge: HOME | End: 2020-05-17
Attending: OBSTETRICS & GYNECOLOGY
Payer: COMMERCIAL

## 2020-05-17 VITALS — DIASTOLIC BLOOD PRESSURE: 60 MMHG | SYSTOLIC BLOOD PRESSURE: 109 MMHG

## 2020-05-17 VITALS — WEIGHT: 145.28 LBS | HEIGHT: 63 IN | BODY MASS INDEX: 25.74 KG/M2

## 2020-05-17 VITALS — SYSTOLIC BLOOD PRESSURE: 109 MMHG | DIASTOLIC BLOOD PRESSURE: 66 MMHG

## 2020-05-17 VITALS — DIASTOLIC BLOOD PRESSURE: 62 MMHG | SYSTOLIC BLOOD PRESSURE: 98 MMHG

## 2020-05-17 VITALS — DIASTOLIC BLOOD PRESSURE: 68 MMHG | SYSTOLIC BLOOD PRESSURE: 107 MMHG

## 2020-05-17 VITALS — DIASTOLIC BLOOD PRESSURE: 70 MMHG | SYSTOLIC BLOOD PRESSURE: 102 MMHG

## 2020-05-17 DIAGNOSIS — Z82.49: ICD-10-CM

## 2020-05-17 DIAGNOSIS — Z79.891: ICD-10-CM

## 2020-05-17 DIAGNOSIS — Z83.49: ICD-10-CM

## 2020-05-17 DIAGNOSIS — Z79.899: ICD-10-CM

## 2020-05-17 DIAGNOSIS — O00.80: Primary | ICD-10-CM

## 2020-05-17 DIAGNOSIS — Z88.5: ICD-10-CM

## 2020-05-17 DIAGNOSIS — D25.2: ICD-10-CM

## 2020-05-17 DIAGNOSIS — Z82.3: ICD-10-CM

## 2020-05-17 DIAGNOSIS — Z88.1: ICD-10-CM

## 2020-05-17 DIAGNOSIS — Z79.1: ICD-10-CM

## 2020-05-17 DIAGNOSIS — N83.12: ICD-10-CM

## 2020-05-17 LAB
ERYTHROCYTE [DISTWIDTH] IN BLOOD BY AUTOMATED COUNT: 13.9 % (ref 9.6–15.2)
MCH RBC QN AUTO: 30.3 PG (ref 27–34.8)
MCHC RBC AUTO-ENTMCNC: 33.4 G/DL (ref 32.4–35.8)
MCV RBC AUTO: 90.6 FL (ref 80–100)
PLATELET # BLD AUTO: 422 X10^3/UL (ref 130–400)
PMV BLD AUTO: 7.7 FL (ref 7.4–10.4)
RBC # BLD AUTO: 4.45 X10^6/UL (ref 3.82–5.3)

## 2020-05-17 PROCEDURE — 88305 TISSUE EXAM BY PATHOLOGIST: CPT

## 2020-05-17 PROCEDURE — 85027 COMPLETE CBC AUTOMATED: CPT

## 2020-05-17 PROCEDURE — 86900 BLOOD TYPING SEROLOGIC ABO: CPT

## 2020-05-17 PROCEDURE — 36415 COLL VENOUS BLD VENIPUNCTURE: CPT

## 2020-05-17 PROCEDURE — 86850 RBC ANTIBODY SCREEN: CPT

## 2020-05-17 PROCEDURE — 59100 REMOVE UTERUS LESION: CPT

## 2020-05-17 PROCEDURE — C1765 ADHESION BARRIER: HCPCS

## 2021-08-05 ENCOUNTER — HOSPITAL ENCOUNTER (EMERGENCY)
Dept: HOSPITAL 8 - ED | Age: 36
Discharge: HOME | End: 2021-08-05
Payer: COMMERCIAL

## 2021-08-05 VITALS — DIASTOLIC BLOOD PRESSURE: 66 MMHG | SYSTOLIC BLOOD PRESSURE: 130 MMHG

## 2021-08-05 VITALS — BODY MASS INDEX: 23.05 KG/M2 | HEIGHT: 63 IN | WEIGHT: 130.07 LBS

## 2021-08-05 DIAGNOSIS — Z3A.12: ICD-10-CM

## 2021-08-05 DIAGNOSIS — O23.11: ICD-10-CM

## 2021-08-05 DIAGNOSIS — O20.0: Primary | ICD-10-CM

## 2021-08-05 LAB
ALBUMIN SERPL-MCNC: 3.7 G/DL (ref 3.4–5)
ANION GAP SERPL CALC-SCNC: 9 MMOL/L (ref 5–15)
BASOPHILS # BLD AUTO: 0 X10^3/UL (ref 0–0.1)
BASOPHILS NFR BLD AUTO: 0 % (ref 0–1)
CALCIUM SERPL-MCNC: 9 MG/DL (ref 8.5–10.1)
CHLORIDE SERPL-SCNC: 111 MMOL/L (ref 98–107)
CREAT SERPL-MCNC: 0.47 MG/DL (ref 0.55–1.02)
EOSINOPHIL # BLD AUTO: 0.1 X10^3/UL (ref 0–0.4)
EOSINOPHIL NFR BLD AUTO: 1 % (ref 1–7)
ERYTHROCYTE [DISTWIDTH] IN BLOOD BY AUTOMATED COUNT: 13.6 % (ref 9.6–15.2)
LYMPHOCYTES # BLD AUTO: 2.9 X10^3/UL (ref 1–3.4)
LYMPHOCYTES NFR BLD AUTO: 27 % (ref 22–44)
MCH RBC QN AUTO: 30.4 PG (ref 27–34.8)
MCHC RBC AUTO-ENTMCNC: 34.4 G/DL (ref 32.4–35.8)
MICROSCOPIC: (no result)
MONOCYTES # BLD AUTO: 0.7 X10^3/UL (ref 0.2–0.8)
MONOCYTES NFR BLD AUTO: 6 % (ref 2–9)
NEUTROPHILS # BLD AUTO: 7.1 X10^3/UL (ref 1.8–6.8)
NEUTROPHILS NFR BLD AUTO: 66 % (ref 42–75)
PLATELET # BLD AUTO: 387 X10^3/UL (ref 130–400)
PMV BLD AUTO: 7.5 FL (ref 7.4–10.4)
RBC # BLD AUTO: 4.01 X10^6/UL (ref 3.82–5.3)

## 2021-08-05 PROCEDURE — 36415 COLL VENOUS BLD VENIPUNCTURE: CPT

## 2021-08-05 PROCEDURE — 87086 URINE CULTURE/COLONY COUNT: CPT

## 2021-08-05 PROCEDURE — 81001 URINALYSIS AUTO W/SCOPE: CPT

## 2021-08-05 PROCEDURE — 99284 EMERGENCY DEPT VISIT MOD MDM: CPT

## 2021-08-05 PROCEDURE — 80048 BASIC METABOLIC PNL TOTAL CA: CPT

## 2021-08-05 PROCEDURE — 76830 TRANSVAGINAL US NON-OB: CPT

## 2021-08-05 PROCEDURE — 85025 COMPLETE CBC W/AUTO DIFF WBC: CPT

## 2021-08-05 PROCEDURE — 86901 BLOOD TYPING SEROLOGIC RH(D): CPT

## 2021-08-05 PROCEDURE — 84702 CHORIONIC GONADOTROPIN TEST: CPT

## 2021-08-05 PROCEDURE — 82040 ASSAY OF SERUM ALBUMIN: CPT

## 2021-08-05 NOTE — NUR
PATIENT RESTING ON GURNEY, STATES SHE STARTED HAVING BLEEDING TODAY REPORTS SHE 
HAS NOT SATURATED A PAD AND SHE IS HAVING MORE SPOTTING. PATIENT DENIES ANY 
CRAMPING AT THIS TIME. PATIENT REPORTS "THIS IS THE FURTHEST I'VE GOTTEN WITH 
ANY PREGNANCY".

## 2021-08-13 ENCOUNTER — HOSPITAL ENCOUNTER (EMERGENCY)
Dept: HOSPITAL 8 - ED | Age: 36
End: 2021-08-13
Payer: COMMERCIAL

## 2021-08-13 VITALS — BODY MASS INDEX: 23.09 KG/M2 | HEIGHT: 63 IN | WEIGHT: 130.29 LBS

## 2021-08-13 VITALS — DIASTOLIC BLOOD PRESSURE: 68 MMHG | SYSTOLIC BLOOD PRESSURE: 116 MMHG

## 2021-08-13 DIAGNOSIS — Z3A.13: ICD-10-CM

## 2021-08-13 DIAGNOSIS — O20.0: Primary | ICD-10-CM

## 2021-08-13 LAB
BASOPHILS # BLD AUTO: 0.1 X10^3/UL (ref 0–0.1)
BASOPHILS NFR BLD AUTO: 1 % (ref 0–1)
EOSINOPHIL # BLD AUTO: 0.1 X10^3/UL (ref 0–0.4)
EOSINOPHIL NFR BLD AUTO: 1 % (ref 1–7)
ERYTHROCYTE [DISTWIDTH] IN BLOOD BY AUTOMATED COUNT: 13.4 % (ref 9.6–15.2)
LYMPHOCYTES # BLD AUTO: 2.2 X10^3/UL (ref 1–3.4)
LYMPHOCYTES NFR BLD AUTO: 18 % (ref 22–44)
MCH RBC QN AUTO: 30 PG (ref 27–34.8)
MCHC RBC AUTO-ENTMCNC: 34 G/DL (ref 32.4–35.8)
MONOCYTES # BLD AUTO: 0.6 X10^3/UL (ref 0.2–0.8)
MONOCYTES NFR BLD AUTO: 5 % (ref 2–9)
NEUTROPHILS # BLD AUTO: 9.1 X10^3/UL (ref 1.8–6.8)
NEUTROPHILS NFR BLD AUTO: 76 % (ref 42–75)
PLATELET # BLD AUTO: 406 X10^3/UL (ref 130–400)
PMV BLD AUTO: 7.5 FL (ref 7.4–10.4)
RBC # BLD AUTO: 4.24 X10^6/UL (ref 3.82–5.3)

## 2021-08-13 PROCEDURE — 99284 EMERGENCY DEPT VISIT MOD MDM: CPT

## 2021-08-13 PROCEDURE — 76801 OB US < 14 WKS SINGLE FETUS: CPT

## 2021-08-13 PROCEDURE — 36415 COLL VENOUS BLD VENIPUNCTURE: CPT

## 2021-08-13 PROCEDURE — 85025 COMPLETE CBC W/AUTO DIFF WBC: CPT

## 2021-08-13 PROCEDURE — 84702 CHORIONIC GONADOTROPIN TEST: CPT

## 2023-01-01 ENCOUNTER — PHARMACY VISIT (OUTPATIENT)
Dept: PHARMACY | Facility: MEDICAL CENTER | Age: 38
End: 2023-01-01
Payer: COMMERCIAL

## 2023-01-01 PROCEDURE — RXOTC WILLOW AMBULATORY OTC CHARGE

## 2023-01-01 PROCEDURE — RXMED WILLOW AMBULATORY MEDICATION CHARGE

## 2023-01-01 RX ORDER — ONDANSETRON 8 MG/1
TABLET, ORALLY DISINTEGRATING ORAL
Qty: 15 TABLET | Refills: 0 | OUTPATIENT
Start: 2023-01-01

## 2023-01-02 ENCOUNTER — PHARMACY VISIT (OUTPATIENT)
Dept: PHARMACY | Facility: MEDICAL CENTER | Age: 38
End: 2023-01-02
Payer: COMMERCIAL

## 2023-01-02 PROCEDURE — RXMED WILLOW AMBULATORY MEDICATION CHARGE: Performed by: REGISTERED NURSE

## 2023-01-02 RX ORDER — SERTRALINE HYDROCHLORIDE 25 MG/1
25 TABLET, FILM COATED ORAL DAILY
Qty: 30 TABLET | Refills: 2 | Status: SHIPPED | OUTPATIENT
Start: 2022-12-01 | End: 2023-02-21 | Stop reason: SDUPTHER

## 2023-01-30 PROCEDURE — RXMED WILLOW AMBULATORY MEDICATION CHARGE: Performed by: REGISTERED NURSE

## 2023-01-31 ENCOUNTER — PHARMACY VISIT (OUTPATIENT)
Dept: PHARMACY | Facility: MEDICAL CENTER | Age: 38
End: 2023-01-31
Payer: COMMERCIAL

## 2023-02-17 ENCOUNTER — PHARMACY VISIT (OUTPATIENT)
Dept: PHARMACY | Facility: MEDICAL CENTER | Age: 38
End: 2023-02-17
Payer: COMMERCIAL

## 2023-02-17 PROCEDURE — RXMED WILLOW AMBULATORY MEDICATION CHARGE: Performed by: REGISTERED NURSE

## 2023-02-21 PROCEDURE — RXMED WILLOW AMBULATORY MEDICATION CHARGE: Performed by: REGISTERED NURSE

## 2023-02-23 ENCOUNTER — PHARMACY VISIT (OUTPATIENT)
Dept: PHARMACY | Facility: MEDICAL CENTER | Age: 38
End: 2023-02-23
Payer: COMMERCIAL

## 2023-03-20 ENCOUNTER — PHARMACY VISIT (OUTPATIENT)
Dept: PHARMACY | Facility: MEDICAL CENTER | Age: 38
End: 2023-03-20
Payer: COMMERCIAL

## 2023-03-20 PROCEDURE — RXMED WILLOW AMBULATORY MEDICATION CHARGE: Performed by: REGISTERED NURSE

## 2023-03-22 ENCOUNTER — PHARMACY VISIT (OUTPATIENT)
Dept: PHARMACY | Facility: MEDICAL CENTER | Age: 38
End: 2023-03-22
Payer: COMMERCIAL

## 2023-03-22 PROCEDURE — RXMED WILLOW AMBULATORY MEDICATION CHARGE: Performed by: REGISTERED NURSE

## 2023-03-22 RX ORDER — METHYLPHENIDATE HYDROCHLORIDE 20 MG/1
TABLET ORAL
Qty: 60 TABLET | Refills: 0 | OUTPATIENT
Start: 2023-03-22 | End: 2023-04-20 | Stop reason: SDUPTHER

## 2023-04-19 PROCEDURE — RXMED WILLOW AMBULATORY MEDICATION CHARGE: Performed by: REGISTERED NURSE

## 2023-04-20 ENCOUNTER — PHARMACY VISIT (OUTPATIENT)
Dept: PHARMACY | Facility: MEDICAL CENTER | Age: 38
End: 2023-04-20
Payer: COMMERCIAL

## 2023-04-20 PROCEDURE — RXMED WILLOW AMBULATORY MEDICATION CHARGE: Performed by: REGISTERED NURSE

## 2023-04-20 RX ORDER — METHYLPHENIDATE HYDROCHLORIDE 20 MG/1
TABLET ORAL
Qty: 60 TABLET | Refills: 0 | OUTPATIENT
Start: 2023-04-20 | End: 2023-05-09 | Stop reason: SDUPTHER

## 2023-05-09 PROCEDURE — RXMED WILLOW AMBULATORY MEDICATION CHARGE: Performed by: REGISTERED NURSE

## 2023-05-12 ENCOUNTER — EMPLOYEE HEALTH (OUTPATIENT)
Dept: OCCUPATIONAL MEDICINE | Facility: CLINIC | Age: 38
End: 2023-05-12

## 2023-05-12 ENCOUNTER — HOSPITAL ENCOUNTER (OUTPATIENT)
Facility: MEDICAL CENTER | Age: 38
End: 2023-05-12
Attending: PREVENTIVE MEDICINE
Payer: COMMERCIAL

## 2023-05-12 ENCOUNTER — EH NON-PROVIDER (OUTPATIENT)
Dept: OCCUPATIONAL MEDICINE | Facility: CLINIC | Age: 38
End: 2023-05-12

## 2023-05-12 DIAGNOSIS — Z02.89 VISIT FOR OCCUPATIONAL HEALTH EXAMINATION: Primary | ICD-10-CM

## 2023-05-12 DIAGNOSIS — Z02.89 VISIT FOR OCCUPATIONAL HEALTH EXAMINATION: ICD-10-CM

## 2023-05-12 LAB
AMP AMPHETAMINE: NORMAL
BAR BARBITURATES: NORMAL
BZO BENZODIAZEPINES: NORMAL
COC COCAINE: NORMAL
INT CON NEG: NORMAL
INT CON POS: NORMAL
MDMA ECSTASY: NORMAL
MET METHAMPHETAMINES: NORMAL
MTD METHADONE: NORMAL
OPI OPIATES: NORMAL
OXY OXYCODONE: NORMAL
PCP PHENCYCLIDINE: NORMAL
POC URINE DRUG SCREEN OCDRS: NORMAL
THC: NORMAL

## 2023-05-12 PROCEDURE — 8915 PR COMPREHENSIVE PHYSICAL: Performed by: PREVENTIVE MEDICINE

## 2023-05-12 PROCEDURE — 86480 TB TEST CELL IMMUN MEASURE: CPT | Performed by: PREVENTIVE MEDICINE

## 2023-05-12 PROCEDURE — 80305 DRUG TEST PRSMV DIR OPT OBS: CPT | Performed by: PREVENTIVE MEDICINE

## 2023-05-12 PROCEDURE — 86787 VARICELLA-ZOSTER ANTIBODY: CPT | Performed by: PREVENTIVE MEDICINE

## 2023-05-13 PROCEDURE — RXMED WILLOW AMBULATORY MEDICATION CHARGE: Performed by: REGISTERED NURSE

## 2023-05-14 LAB — VZV IGG SER IA-ACNC: 749 IV

## 2023-05-15 LAB
GAMMA INTERFERON BACKGROUND BLD IA-ACNC: 0.06 IU/ML
M TB IFN-G BLD-IMP: NEGATIVE
M TB IFN-G CD4+ BCKGRND COR BLD-ACNC: -0.01 IU/ML
MITOGEN IGNF BCKGRD COR BLD-ACNC: >10 IU/ML
QFT TB2 - NIL TBQ2: -0.03 IU/ML

## 2023-05-16 ENCOUNTER — PHARMACY VISIT (OUTPATIENT)
Dept: PHARMACY | Facility: MEDICAL CENTER | Age: 38
End: 2023-05-16
Payer: COMMERCIAL

## 2023-05-26 ENCOUNTER — EH NON-PROVIDER (OUTPATIENT)
Dept: OCCUPATIONAL MEDICINE | Facility: CLINIC | Age: 38
End: 2023-05-26

## 2023-06-15 ENCOUNTER — PHARMACY VISIT (OUTPATIENT)
Dept: PHARMACY | Facility: MEDICAL CENTER | Age: 38
End: 2023-06-15
Payer: COMMERCIAL

## 2023-06-15 PROCEDURE — RXMED WILLOW AMBULATORY MEDICATION CHARGE: Performed by: REGISTERED NURSE

## 2023-07-14 ENCOUNTER — PHARMACY VISIT (OUTPATIENT)
Dept: PHARMACY | Facility: MEDICAL CENTER | Age: 38
End: 2023-07-14
Payer: COMMERCIAL

## 2023-07-14 PROCEDURE — RXMED WILLOW AMBULATORY MEDICATION CHARGE: Performed by: REGISTERED NURSE

## 2023-07-14 RX ORDER — METHYLPHENIDATE HYDROCHLORIDE 20 MG/1
TABLET ORAL
Qty: 60 TABLET | Refills: 0 | OUTPATIENT
Start: 2023-07-14 | End: 2023-08-08 | Stop reason: SDUPTHER

## 2023-08-03 PROCEDURE — RXMED WILLOW AMBULATORY MEDICATION CHARGE: Performed by: REGISTERED NURSE

## 2023-08-04 ENCOUNTER — PHARMACY VISIT (OUTPATIENT)
Dept: PHARMACY | Facility: MEDICAL CENTER | Age: 38
End: 2023-08-04
Payer: COMMERCIAL

## 2023-08-11 ENCOUNTER — PHARMACY VISIT (OUTPATIENT)
Dept: PHARMACY | Facility: MEDICAL CENTER | Age: 38
End: 2023-08-11
Payer: COMMERCIAL

## 2023-08-11 PROCEDURE — RXMED WILLOW AMBULATORY MEDICATION CHARGE: Performed by: REGISTERED NURSE

## 2023-08-23 ENCOUNTER — HOSPITAL ENCOUNTER (OUTPATIENT)
Facility: MEDICAL CENTER | Age: 38
End: 2023-08-23
Attending: FAMILY MEDICINE
Payer: COMMERCIAL

## 2023-08-23 ENCOUNTER — OFFICE VISIT (OUTPATIENT)
Dept: MEDICAL GROUP | Facility: IMAGING CENTER | Age: 38
End: 2023-08-23
Payer: COMMERCIAL

## 2023-08-23 VITALS
HEIGHT: 63 IN | BODY MASS INDEX: 24.38 KG/M2 | SYSTOLIC BLOOD PRESSURE: 100 MMHG | RESPIRATION RATE: 16 BRPM | DIASTOLIC BLOOD PRESSURE: 68 MMHG | TEMPERATURE: 98 F | WEIGHT: 137.6 LBS | OXYGEN SATURATION: 97 % | HEART RATE: 68 BPM

## 2023-08-23 DIAGNOSIS — N89.8 VAGINAL ITCHING: ICD-10-CM

## 2023-08-23 DIAGNOSIS — L98.9 SKIN LESION: ICD-10-CM

## 2023-08-23 DIAGNOSIS — Z86.19 HISTORY OF HERPES GENITALIS: ICD-10-CM

## 2023-08-23 DIAGNOSIS — F90.9 ATTENTION DEFICIT HYPERACTIVITY DISORDER (ADHD), UNSPECIFIED ADHD TYPE: ICD-10-CM

## 2023-08-23 DIAGNOSIS — F31.9 MILD BIPOLAR DISORDER (HCC): ICD-10-CM

## 2023-08-23 DIAGNOSIS — Z13.21 ENCOUNTER FOR VITAMIN DEFICIENCY SCREENING: ICD-10-CM

## 2023-08-23 DIAGNOSIS — Z13.0 SCREENING FOR DEFICIENCY ANEMIA: ICD-10-CM

## 2023-08-23 DIAGNOSIS — F19.11 HISTORY OF SUBSTANCE ABUSE (HCC): ICD-10-CM

## 2023-08-23 DIAGNOSIS — Z13.220 SCREENING, LIPID: ICD-10-CM

## 2023-08-23 DIAGNOSIS — R63.5 WEIGHT GAIN: ICD-10-CM

## 2023-08-23 DIAGNOSIS — Z13.29 SCREENING FOR ENDOCRINE DISORDER: ICD-10-CM

## 2023-08-23 DIAGNOSIS — B00.9 HSV INFECTION: ICD-10-CM

## 2023-08-23 DIAGNOSIS — Z72.89 ENGAGES IN VAPING: ICD-10-CM

## 2023-08-23 DIAGNOSIS — Z13.1 SCREENING FOR DIABETES MELLITUS: ICD-10-CM

## 2023-08-23 DIAGNOSIS — Z11.59 NEED FOR HEPATITIS C SCREENING TEST: ICD-10-CM

## 2023-08-23 DIAGNOSIS — R68.82 LOW LIBIDO: ICD-10-CM

## 2023-08-23 PROCEDURE — 87660 TRICHOMONAS VAGIN DIR PROBE: CPT

## 2023-08-23 PROCEDURE — 3078F DIAST BP <80 MM HG: CPT | Performed by: FAMILY MEDICINE

## 2023-08-23 PROCEDURE — 3074F SYST BP LT 130 MM HG: CPT | Performed by: FAMILY MEDICINE

## 2023-08-23 PROCEDURE — 87480 CANDIDA DNA DIR PROBE: CPT

## 2023-08-23 PROCEDURE — 99204 OFFICE O/P NEW MOD 45 MIN: CPT | Performed by: FAMILY MEDICINE

## 2023-08-23 PROCEDURE — RXMED WILLOW AMBULATORY MEDICATION CHARGE: Performed by: FAMILY MEDICINE

## 2023-08-23 PROCEDURE — 87510 GARDNER VAG DNA DIR PROBE: CPT

## 2023-08-23 RX ORDER — VALACYCLOVIR HYDROCHLORIDE 500 MG/1
500 TABLET, FILM COATED ORAL 2 TIMES DAILY
Qty: 40 TABLET | Refills: 0 | Status: SHIPPED | OUTPATIENT
Start: 2023-08-23

## 2023-08-23 SDOH — HEALTH STABILITY: PHYSICAL HEALTH: ON AVERAGE, HOW MANY DAYS PER WEEK DO YOU ENGAGE IN MODERATE TO STRENUOUS EXERCISE (LIKE A BRISK WALK)?: 3 DAYS

## 2023-08-23 SDOH — ECONOMIC STABILITY: HOUSING INSECURITY: IN THE LAST 12 MONTHS, HOW MANY PLACES HAVE YOU LIVED?: 1

## 2023-08-23 SDOH — ECONOMIC STABILITY: TRANSPORTATION INSECURITY
IN THE PAST 12 MONTHS, HAS THE LACK OF TRANSPORTATION KEPT YOU FROM MEDICAL APPOINTMENTS OR FROM GETTING MEDICATIONS?: NO

## 2023-08-23 SDOH — ECONOMIC STABILITY: FOOD INSECURITY: WITHIN THE PAST 12 MONTHS, THE FOOD YOU BOUGHT JUST DIDN'T LAST AND YOU DIDN'T HAVE MONEY TO GET MORE.: SOMETIMES TRUE

## 2023-08-23 SDOH — ECONOMIC STABILITY: HOUSING INSECURITY
IN THE LAST 12 MONTHS, WAS THERE A TIME WHEN YOU DID NOT HAVE A STEADY PLACE TO SLEEP OR SLEPT IN A SHELTER (INCLUDING NOW)?: NO

## 2023-08-23 SDOH — HEALTH STABILITY: PHYSICAL HEALTH: ON AVERAGE, HOW MANY MINUTES DO YOU ENGAGE IN EXERCISE AT THIS LEVEL?: 30 MIN

## 2023-08-23 SDOH — ECONOMIC STABILITY: INCOME INSECURITY: IN THE LAST 12 MONTHS, WAS THERE A TIME WHEN YOU WERE NOT ABLE TO PAY THE MORTGAGE OR RENT ON TIME?: NO

## 2023-08-23 SDOH — ECONOMIC STABILITY: INCOME INSECURITY: HOW HARD IS IT FOR YOU TO PAY FOR THE VERY BASICS LIKE FOOD, HOUSING, MEDICAL CARE, AND HEATING?: SOMEWHAT HARD

## 2023-08-23 SDOH — ECONOMIC STABILITY: TRANSPORTATION INSECURITY
IN THE PAST 12 MONTHS, HAS LACK OF RELIABLE TRANSPORTATION KEPT YOU FROM MEDICAL APPOINTMENTS, MEETINGS, WORK OR FROM GETTING THINGS NEEDED FOR DAILY LIVING?: NO

## 2023-08-23 SDOH — ECONOMIC STABILITY: TRANSPORTATION INSECURITY
IN THE PAST 12 MONTHS, HAS LACK OF TRANSPORTATION KEPT YOU FROM MEETINGS, WORK, OR FROM GETTING THINGS NEEDED FOR DAILY LIVING?: NO

## 2023-08-23 SDOH — HEALTH STABILITY: MENTAL HEALTH
STRESS IS WHEN SOMEONE FEELS TENSE, NERVOUS, ANXIOUS, OR CAN'T SLEEP AT NIGHT BECAUSE THEIR MIND IS TROUBLED. HOW STRESSED ARE YOU?: ONLY A LITTLE

## 2023-08-23 SDOH — ECONOMIC STABILITY: FOOD INSECURITY: WITHIN THE PAST 12 MONTHS, YOU WORRIED THAT YOUR FOOD WOULD RUN OUT BEFORE YOU GOT MONEY TO BUY MORE.: PATIENT DECLINED

## 2023-08-23 ASSESSMENT — SOCIAL DETERMINANTS OF HEALTH (SDOH)
HOW OFTEN DO YOU ATTENT MEETINGS OF THE CLUB OR ORGANIZATION YOU BELONG TO?: NEVER
HOW OFTEN DO YOU HAVE SIX OR MORE DRINKS ON ONE OCCASION: NEVER
DO YOU BELONG TO ANY CLUBS OR ORGANIZATIONS SUCH AS CHURCH GROUPS UNIONS, FRATERNAL OR ATHLETIC GROUPS, OR SCHOOL GROUPS?: NO
HOW OFTEN DO YOU ATTEND CHURCH OR RELIGIOUS SERVICES?: MORE THAN 4 TIMES PER YEAR
DO YOU BELONG TO ANY CLUBS OR ORGANIZATIONS SUCH AS CHURCH GROUPS UNIONS, FRATERNAL OR ATHLETIC GROUPS, OR SCHOOL GROUPS?: NO
HOW HARD IS IT FOR YOU TO PAY FOR THE VERY BASICS LIKE FOOD, HOUSING, MEDICAL CARE, AND HEATING?: SOMEWHAT HARD
HOW OFTEN DO YOU HAVE A DRINK CONTAINING ALCOHOL: NEVER
HOW OFTEN DO YOU ATTEND CHURCH OR RELIGIOUS SERVICES?: MORE THAN 4 TIMES PER YEAR
HOW OFTEN DO YOU GET TOGETHER WITH FRIENDS OR RELATIVES?: ONCE A WEEK
HOW MANY DRINKS CONTAINING ALCOHOL DO YOU HAVE ON A TYPICAL DAY WHEN YOU ARE DRINKING: PATIENT DOES NOT DRINK
IN A TYPICAL WEEK, HOW MANY TIMES DO YOU TALK ON THE PHONE WITH FAMILY, FRIENDS, OR NEIGHBORS?: MORE THAN THREE TIMES A WEEK
IN A TYPICAL WEEK, HOW MANY TIMES DO YOU TALK ON THE PHONE WITH FAMILY, FRIENDS, OR NEIGHBORS?: MORE THAN THREE TIMES A WEEK
HOW OFTEN DO YOU ATTENT MEETINGS OF THE CLUB OR ORGANIZATION YOU BELONG TO?: NEVER
HOW OFTEN DO YOU GET TOGETHER WITH FRIENDS OR RELATIVES?: ONCE A WEEK
WITHIN THE PAST 12 MONTHS, YOU WORRIED THAT YOUR FOOD WOULD RUN OUT BEFORE YOU GOT THE MONEY TO BUY MORE: PATIENT DECLINED

## 2023-08-23 ASSESSMENT — PATIENT HEALTH QUESTIONNAIRE - PHQ9: CLINICAL INTERPRETATION OF PHQ2 SCORE: 0

## 2023-08-23 ASSESSMENT — LIFESTYLE VARIABLES
AUDIT-C TOTAL SCORE: 0
HOW OFTEN DO YOU HAVE SIX OR MORE DRINKS ON ONE OCCASION: NEVER
SKIP TO QUESTIONS 9-10: 1
HOW OFTEN DO YOU HAVE A DRINK CONTAINING ALCOHOL: NEVER
HOW MANY STANDARD DRINKS CONTAINING ALCOHOL DO YOU HAVE ON A TYPICAL DAY: PATIENT DOES NOT DRINK

## 2023-08-23 ASSESSMENT — PAIN SCALES - GENERAL: PAINLEVEL: NO PAIN
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
## 2023-08-23 NOTE — PATIENT INSTRUCTIONS
You must fast 8 to 10 hours.  Lab orders are in the system which you can complete at any [facility] lab.  Please follow-up for a visit after your lab work is complete.

## 2023-08-24 LAB
CANDIDA DNA VAG QL PROBE+SIG AMP: NEGATIVE
G VAGINALIS DNA VAG QL PROBE+SIG AMP: NEGATIVE
T VAGINALIS DNA VAG QL PROBE+SIG AMP: NEGATIVE

## 2023-08-27 PROCEDURE — RXMED WILLOW AMBULATORY MEDICATION CHARGE: Performed by: REGISTERED NURSE

## 2023-08-28 PROBLEM — F90.9 ATTENTION DEFICIT HYPERACTIVITY DISORDER (ADHD): Status: ACTIVE | Noted: 2023-08-28

## 2023-08-28 PROBLEM — F31.9 MILD BIPOLAR DISORDER (HCC): Status: ACTIVE | Noted: 2023-08-28

## 2023-08-28 PROBLEM — F19.11 HISTORY OF SUBSTANCE ABUSE (HCC): Status: ACTIVE | Noted: 2023-08-28

## 2023-09-01 ENCOUNTER — PHARMACY VISIT (OUTPATIENT)
Dept: PHARMACY | Facility: MEDICAL CENTER | Age: 38
End: 2023-09-01
Payer: COMMERCIAL

## 2023-09-01 ENCOUNTER — HOSPITAL ENCOUNTER (OUTPATIENT)
Dept: LAB | Facility: MEDICAL CENTER | Age: 38
End: 2023-09-01
Payer: COMMERCIAL

## 2023-09-01 PROCEDURE — 87624 HPV HI-RISK TYP POOLED RSLT: CPT

## 2023-09-01 PROCEDURE — 88175 CYTOPATH C/V AUTO FLUID REDO: CPT

## 2023-09-01 PROCEDURE — RXMED WILLOW AMBULATORY MEDICATION CHARGE: Performed by: FAMILY MEDICINE

## 2023-09-06 LAB
CYTOLOGIST CVX/VAG CYTO: NORMAL
CYTOLOGY CVX/VAG DOC CYTO: NORMAL
CYTOLOGY CVX/VAG DOC THIN PREP: NORMAL
HPV I/H RISK 4 DNA CVX QL PROBE+SIG AMP: NEGATIVE
HPV REFLEX NL1174300: NORMAL
NOTE NL11727A: NORMAL
OTHER STN SPEC: NORMAL
QC REVIEWED BY NL11722A: NORMAL
STAT OF ADQ CVX/VAG CYTO-IMP: NORMAL

## 2023-09-11 PROCEDURE — RXMED WILLOW AMBULATORY MEDICATION CHARGE: Performed by: PSYCHIATRY & NEUROLOGY

## 2023-09-12 ENCOUNTER — PHARMACY VISIT (OUTPATIENT)
Dept: PHARMACY | Facility: MEDICAL CENTER | Age: 38
End: 2023-09-12
Payer: COMMERCIAL

## 2023-10-06 ENCOUNTER — APPOINTMENT (OUTPATIENT)
Dept: MEDICAL GROUP | Facility: IMAGING CENTER | Age: 38
End: 2023-10-06
Payer: COMMERCIAL

## 2023-10-06 ENCOUNTER — HOSPITAL ENCOUNTER (OUTPATIENT)
Facility: MEDICAL CENTER | Age: 38
End: 2023-10-06
Attending: OBSTETRICS & GYNECOLOGY
Payer: COMMERCIAL

## 2023-10-06 PROCEDURE — 87624 HPV HI-RISK TYP POOLED RSLT: CPT

## 2023-10-06 PROCEDURE — 88175 CYTOPATH C/V AUTO FLUID REDO: CPT

## 2023-10-11 LAB
CYTOLOGIST CVX/VAG CYTO: NORMAL
CYTOLOGY CVX/VAG DOC CYTO: NORMAL
CYTOLOGY CVX/VAG DOC THIN PREP: NORMAL
HPV I/H RISK 4 DNA CVX QL PROBE+SIG AMP: NEGATIVE
NOTE NL11727A: NORMAL
OTHER STN SPEC: NORMAL
STAT OF ADQ CVX/VAG CYTO-IMP: NORMAL

## 2023-10-13 ENCOUNTER — PHARMACY VISIT (OUTPATIENT)
Dept: PHARMACY | Facility: MEDICAL CENTER | Age: 38
End: 2023-10-13
Payer: COMMERCIAL

## 2023-10-13 PROCEDURE — RXMED WILLOW AMBULATORY MEDICATION CHARGE: Performed by: PSYCHIATRY & NEUROLOGY

## 2023-10-13 RX ORDER — HYDROXYZINE HYDROCHLORIDE 25 MG/1
TABLET, FILM COATED ORAL
Qty: 30 TABLET | Refills: 2 | Status: ON HOLD | OUTPATIENT
Start: 2023-10-12 | End: 2024-02-01

## 2023-10-13 RX ORDER — METHYLPHENIDATE HYDROCHLORIDE 27 MG/1
TABLET ORAL
Qty: 30 TABLET | Refills: 0 | Status: ON HOLD | OUTPATIENT
Start: 2023-10-12 | End: 2024-02-01

## 2023-10-25 ENCOUNTER — HOSPITAL ENCOUNTER (OUTPATIENT)
Dept: LAB | Facility: MEDICAL CENTER | Age: 38
End: 2023-10-25
Attending: OBSTETRICS & GYNECOLOGY
Payer: COMMERCIAL

## 2023-10-25 LAB
25(OH)D3 SERPL-MCNC: 25 NG/ML (ref 30–100)
B-HCG SERPL-ACNC: <1 MIU/ML (ref 0–5)
BASOPHILS # BLD AUTO: 0.3 % (ref 0–1.8)
BASOPHILS # BLD: 0.03 K/UL (ref 0–0.12)
EOSINOPHIL # BLD AUTO: 0.15 K/UL (ref 0–0.51)
EOSINOPHIL NFR BLD: 1.7 % (ref 0–6.9)
ERYTHROCYTE [DISTWIDTH] IN BLOOD BY AUTOMATED COUNT: 40.5 FL (ref 35.9–50)
EST. AVERAGE GLUCOSE BLD GHB EST-MCNC: 105 MG/DL
ESTRADIOL SERPL-MCNC: 42 PG/ML
FSH SERPL-ACNC: 5.1 MIU/ML
HBA1C MFR BLD: 5.3 % (ref 4–5.6)
HCT VFR BLD AUTO: 41.6 % (ref 37–47)
HGB BLD-MCNC: 14.4 G/DL (ref 12–16)
IMM GRANULOCYTES # BLD AUTO: 0.03 K/UL (ref 0–0.11)
IMM GRANULOCYTES NFR BLD AUTO: 0.3 % (ref 0–0.9)
LH SERPL-ACNC: 14.4 IU/L
LYMPHOCYTES # BLD AUTO: 2.94 K/UL (ref 1–4.8)
LYMPHOCYTES NFR BLD: 32.8 % (ref 22–41)
MCH RBC QN AUTO: 30.3 PG (ref 27–33)
MCHC RBC AUTO-ENTMCNC: 34.6 G/DL (ref 32.2–35.5)
MCV RBC AUTO: 87.6 FL (ref 81.4–97.8)
MONOCYTES # BLD AUTO: 0.59 K/UL (ref 0–0.85)
MONOCYTES NFR BLD AUTO: 6.6 % (ref 0–13.4)
NEUTROPHILS # BLD AUTO: 5.21 K/UL (ref 1.82–7.42)
NEUTROPHILS NFR BLD: 58.3 % (ref 44–72)
NRBC # BLD AUTO: 0 K/UL
NRBC BLD-RTO: 0 /100 WBC (ref 0–0.2)
PLATELET # BLD AUTO: 420 K/UL (ref 164–446)
PMV BLD AUTO: 9.6 FL (ref 9–12.9)
RBC # BLD AUTO: 4.75 M/UL (ref 4.2–5.4)
T4 FREE SERPL-MCNC: 1 NG/DL (ref 0.93–1.7)
TSH SERPL DL<=0.005 MIU/L-ACNC: 1.74 UIU/ML (ref 0.38–5.33)
WBC # BLD AUTO: 9 K/UL (ref 4.8–10.8)

## 2023-10-25 PROCEDURE — 83001 ASSAY OF GONADOTROPIN (FSH): CPT

## 2023-10-25 PROCEDURE — 84402 ASSAY OF FREE TESTOSTERONE: CPT

## 2023-10-25 PROCEDURE — 83002 ASSAY OF GONADOTROPIN (LH): CPT

## 2023-10-25 PROCEDURE — 83036 HEMOGLOBIN GLYCOSYLATED A1C: CPT

## 2023-10-25 PROCEDURE — 84270 ASSAY OF SEX HORMONE GLOBUL: CPT

## 2023-10-25 PROCEDURE — 82626 DEHYDROEPIANDROSTERONE: CPT

## 2023-10-25 PROCEDURE — 36415 COLL VENOUS BLD VENIPUNCTURE: CPT

## 2023-10-25 PROCEDURE — 82670 ASSAY OF TOTAL ESTRADIOL: CPT

## 2023-10-25 PROCEDURE — 85025 COMPLETE CBC W/AUTO DIFF WBC: CPT

## 2023-10-25 PROCEDURE — 84403 ASSAY OF TOTAL TESTOSTERONE: CPT

## 2023-10-25 PROCEDURE — 84439 ASSAY OF FREE THYROXINE: CPT

## 2023-10-25 PROCEDURE — 84443 ASSAY THYROID STIM HORMONE: CPT

## 2023-10-25 PROCEDURE — 83498 ASY HYDROXYPROGESTERONE 17-D: CPT

## 2023-10-25 PROCEDURE — 84702 CHORIONIC GONADOTROPIN TEST: CPT

## 2023-10-25 PROCEDURE — 83520 IMMUNOASSAY QUANT NOS NONAB: CPT

## 2023-10-25 PROCEDURE — 82306 VITAMIN D 25 HYDROXY: CPT

## 2023-10-28 LAB — MIS SERPL-MCNC: 4.1 NG/ML (ref 0.18–11.71)

## 2023-10-29 LAB
17OHP SERPL-MCNC: 41.12 NG/DL
DHEA SERPL-MCNC: 4.24 NG/ML (ref 1.33–7.78)
SHBG SERPL-SCNC: 22 NMOL/L (ref 25–122)
TESTOST FREE SERPL-MCNC: 4 PG/ML (ref 1.3–9.2)
TESTOST SERPL-MCNC: 20 NG/DL (ref 9–55)

## 2023-11-02 ENCOUNTER — APPOINTMENT (OUTPATIENT)
Dept: MEDICAL GROUP | Facility: IMAGING CENTER | Age: 38
End: 2023-11-02
Payer: COMMERCIAL

## 2023-11-02 ENCOUNTER — PHARMACY VISIT (OUTPATIENT)
Dept: PHARMACY | Facility: MEDICAL CENTER | Age: 38
End: 2023-11-02
Payer: COMMERCIAL

## 2023-11-02 PROCEDURE — RXMED WILLOW AMBULATORY MEDICATION CHARGE: Performed by: PSYCHIATRY & NEUROLOGY

## 2023-11-10 ENCOUNTER — APPOINTMENT (OUTPATIENT)
Dept: MEDICAL GROUP | Facility: IMAGING CENTER | Age: 38
End: 2023-11-10
Payer: COMMERCIAL

## 2023-12-05 ENCOUNTER — PHARMACY VISIT (OUTPATIENT)
Dept: PHARMACY | Facility: MEDICAL CENTER | Age: 38
End: 2023-12-05
Payer: COMMERCIAL

## 2023-12-05 PROCEDURE — RXOTC WILLOW AMBULATORY OTC CHARGE

## 2023-12-05 PROCEDURE — RXMED WILLOW AMBULATORY MEDICATION CHARGE: Performed by: REGISTERED NURSE

## 2023-12-05 PROCEDURE — RXMED WILLOW AMBULATORY MEDICATION CHARGE: Performed by: PSYCHIATRY & NEUROLOGY

## 2023-12-10 ENCOUNTER — HOSPITAL ENCOUNTER (OUTPATIENT)
Dept: RADIOLOGY | Facility: MEDICAL CENTER | Age: 38
End: 2023-12-10
Attending: OBSTETRICS & GYNECOLOGY
Payer: COMMERCIAL

## 2023-12-10 DIAGNOSIS — N93.9 HEMORRHAGE IN UTERUS: ICD-10-CM

## 2023-12-10 PROCEDURE — 76830 TRANSVAGINAL US NON-OB: CPT

## 2023-12-14 ENCOUNTER — APPOINTMENT (OUTPATIENT)
Dept: MEDICAL GROUP | Facility: IMAGING CENTER | Age: 38
End: 2023-12-14
Payer: COMMERCIAL

## 2024-01-05 PROCEDURE — RXMED WILLOW AMBULATORY MEDICATION CHARGE: Performed by: PSYCHIATRY & NEUROLOGY

## 2024-01-07 ENCOUNTER — PHARMACY VISIT (OUTPATIENT)
Dept: PHARMACY | Facility: MEDICAL CENTER | Age: 39
End: 2024-01-07
Payer: COMMERCIAL

## 2024-01-11 PROCEDURE — RXMED WILLOW AMBULATORY MEDICATION CHARGE: Performed by: OBSTETRICS & GYNECOLOGY

## 2024-01-16 ENCOUNTER — APPOINTMENT (OUTPATIENT)
Dept: ADMISSIONS | Facility: MEDICAL CENTER | Age: 39
End: 2024-01-16
Attending: OBSTETRICS & GYNECOLOGY
Payer: COMMERCIAL

## 2024-01-19 PROCEDURE — RXMED WILLOW AMBULATORY MEDICATION CHARGE: Performed by: OBSTETRICS & GYNECOLOGY

## 2024-01-22 ENCOUNTER — PRE-ADMISSION TESTING (OUTPATIENT)
Dept: ADMISSIONS | Facility: MEDICAL CENTER | Age: 39
End: 2024-01-22
Attending: OBSTETRICS & GYNECOLOGY
Payer: COMMERCIAL

## 2024-01-22 NOTE — OR NURSING
Called Dr. Leigh Saldaña's office and spoke to surgery scheduler Roma, who clarified pre-op orders marked cxr or cod.  Roma stated it was marked cxr.

## 2024-01-23 PROCEDURE — RXMED WILLOW AMBULATORY MEDICATION CHARGE: Performed by: OBSTETRICS & GYNECOLOGY

## 2024-01-24 ENCOUNTER — PHARMACY VISIT (OUTPATIENT)
Dept: PHARMACY | Facility: MEDICAL CENTER | Age: 39
End: 2024-01-24
Payer: COMMERCIAL

## 2024-01-25 ENCOUNTER — HOSPITAL ENCOUNTER (OUTPATIENT)
Dept: RADIOLOGY | Facility: MEDICAL CENTER | Age: 39
End: 2024-01-25
Attending: OBSTETRICS & GYNECOLOGY
Payer: COMMERCIAL

## 2024-01-25 ENCOUNTER — PRE-ADMISSION TESTING (OUTPATIENT)
Dept: ADMISSIONS | Facility: MEDICAL CENTER | Age: 39
End: 2024-01-25
Attending: OBSTETRICS & GYNECOLOGY
Payer: COMMERCIAL

## 2024-01-25 DIAGNOSIS — Z01.811 PRE-OPERATIVE RESPIRATORY EXAMINATION: ICD-10-CM

## 2024-01-25 DIAGNOSIS — Z01.810 PRE-OPERATIVE CARDIOVASCULAR EXAMINATION: ICD-10-CM

## 2024-01-25 DIAGNOSIS — Z01.812 PRE-OPERATIVE LABORATORY EXAMINATION: ICD-10-CM

## 2024-01-25 LAB
ANION GAP SERPL CALC-SCNC: 13 MMOL/L (ref 7–16)
BASOPHILS # BLD AUTO: 0.4 % (ref 0–1.8)
BASOPHILS # BLD: 0.03 K/UL (ref 0–0.12)
BUN SERPL-MCNC: 9 MG/DL (ref 8–22)
CALCIUM SERPL-MCNC: 9.2 MG/DL (ref 8.5–10.5)
CHLORIDE SERPL-SCNC: 106 MMOL/L (ref 96–112)
CO2 SERPL-SCNC: 20 MMOL/L (ref 20–33)
CREAT SERPL-MCNC: 0.55 MG/DL (ref 0.5–1.4)
EKG IMPRESSION: NORMAL
EOSINOPHIL # BLD AUTO: 0.12 K/UL (ref 0–0.51)
EOSINOPHIL NFR BLD: 1.5 % (ref 0–6.9)
ERYTHROCYTE [DISTWIDTH] IN BLOOD BY AUTOMATED COUNT: 39.9 FL (ref 35.9–50)
GFR SERPLBLD CREATININE-BSD FMLA CKD-EPI: 121 ML/MIN/1.73 M 2
GLUCOSE SERPL-MCNC: 101 MG/DL (ref 65–99)
HCG SERPL QL: NEGATIVE
HCT VFR BLD AUTO: 39.1 % (ref 37–47)
HGB BLD-MCNC: 14.1 G/DL (ref 12–16)
IMM GRANULOCYTES # BLD AUTO: 0.02 K/UL (ref 0–0.11)
IMM GRANULOCYTES NFR BLD AUTO: 0.2 % (ref 0–0.9)
LYMPHOCYTES # BLD AUTO: 2.85 K/UL (ref 1–4.8)
LYMPHOCYTES NFR BLD: 35 % (ref 22–41)
MCH RBC QN AUTO: 30.6 PG (ref 27–33)
MCHC RBC AUTO-ENTMCNC: 36.1 G/DL (ref 32.2–35.5)
MCV RBC AUTO: 84.8 FL (ref 81.4–97.8)
MONOCYTES # BLD AUTO: 0.42 K/UL (ref 0–0.85)
MONOCYTES NFR BLD AUTO: 5.2 % (ref 0–13.4)
NEUTROPHILS # BLD AUTO: 4.7 K/UL (ref 1.82–7.42)
NEUTROPHILS NFR BLD: 57.7 % (ref 44–72)
NRBC # BLD AUTO: 0 K/UL
NRBC BLD-RTO: 0 /100 WBC (ref 0–0.2)
PLATELET # BLD AUTO: 413 K/UL (ref 164–446)
PMV BLD AUTO: 9.3 FL (ref 9–12.9)
POTASSIUM SERPL-SCNC: 3.9 MMOL/L (ref 3.6–5.5)
RBC # BLD AUTO: 4.61 M/UL (ref 4.2–5.4)
SODIUM SERPL-SCNC: 139 MMOL/L (ref 135–145)
WBC # BLD AUTO: 8.1 K/UL (ref 4.8–10.8)

## 2024-01-25 PROCEDURE — 93010 ELECTROCARDIOGRAM REPORT: CPT | Performed by: STUDENT IN AN ORGANIZED HEALTH CARE EDUCATION/TRAINING PROGRAM

## 2024-01-25 PROCEDURE — 71046 X-RAY EXAM CHEST 2 VIEWS: CPT

## 2024-01-25 PROCEDURE — 84703 CHORIONIC GONADOTROPIN ASSAY: CPT

## 2024-01-25 PROCEDURE — 80048 BASIC METABOLIC PNL TOTAL CA: CPT

## 2024-01-25 PROCEDURE — 93005 ELECTROCARDIOGRAM TRACING: CPT

## 2024-01-25 PROCEDURE — 85025 COMPLETE CBC W/AUTO DIFF WBC: CPT

## 2024-01-25 PROCEDURE — 36415 COLL VENOUS BLD VENIPUNCTURE: CPT

## 2024-01-30 PROCEDURE — RXMED WILLOW AMBULATORY MEDICATION CHARGE: Performed by: PSYCHIATRY & NEUROLOGY

## 2024-02-01 ENCOUNTER — HOSPITAL ENCOUNTER (OUTPATIENT)
Facility: MEDICAL CENTER | Age: 39
End: 2024-02-01
Attending: OBSTETRICS & GYNECOLOGY | Admitting: OBSTETRICS & GYNECOLOGY
Payer: COMMERCIAL

## 2024-02-01 ENCOUNTER — ANESTHESIA (OUTPATIENT)
Dept: SURGERY | Facility: MEDICAL CENTER | Age: 39
End: 2024-02-01
Payer: COMMERCIAL

## 2024-02-01 ENCOUNTER — ANESTHESIA EVENT (OUTPATIENT)
Dept: SURGERY | Facility: MEDICAL CENTER | Age: 39
End: 2024-02-01
Payer: COMMERCIAL

## 2024-02-01 VITALS
DIASTOLIC BLOOD PRESSURE: 85 MMHG | HEIGHT: 64 IN | BODY MASS INDEX: 25.63 KG/M2 | TEMPERATURE: 97.3 F | HEART RATE: 84 BPM | RESPIRATION RATE: 16 BRPM | WEIGHT: 150.13 LBS | OXYGEN SATURATION: 97 % | SYSTOLIC BLOOD PRESSURE: 130 MMHG

## 2024-02-01 LAB — HCG SERPL QL: NEGATIVE

## 2024-02-01 PROCEDURE — 700102 HCHG RX REV CODE 250 W/ 637 OVERRIDE(OP): Performed by: ANESTHESIOLOGY

## 2024-02-01 PROCEDURE — 84703 CHORIONIC GONADOTROPIN ASSAY: CPT

## 2024-02-01 PROCEDURE — 160029 HCHG SURGERY MINUTES - 1ST 30 MINS LEVEL 4: Performed by: OBSTETRICS & GYNECOLOGY

## 2024-02-01 PROCEDURE — 36415 COLL VENOUS BLD VENIPUNCTURE: CPT

## 2024-02-01 PROCEDURE — 160009 HCHG ANES TIME/MIN: Performed by: OBSTETRICS & GYNECOLOGY

## 2024-02-01 PROCEDURE — A9270 NON-COVERED ITEM OR SERVICE: HCPCS | Performed by: ANESTHESIOLOGY

## 2024-02-01 PROCEDURE — 88305 TISSUE EXAM BY PATHOLOGIST: CPT

## 2024-02-01 PROCEDURE — 160046 HCHG PACU - 1ST 60 MINS PHASE II: Performed by: OBSTETRICS & GYNECOLOGY

## 2024-02-01 PROCEDURE — 160041 HCHG SURGERY MINUTES - EA ADDL 1 MIN LEVEL 4: Performed by: OBSTETRICS & GYNECOLOGY

## 2024-02-01 PROCEDURE — 160048 HCHG OR STATISTICAL LEVEL 1-5: Performed by: OBSTETRICS & GYNECOLOGY

## 2024-02-01 PROCEDURE — 700105 HCHG RX REV CODE 258: Performed by: ANESTHESIOLOGY

## 2024-02-01 PROCEDURE — 700111 HCHG RX REV CODE 636 W/ 250 OVERRIDE (IP): Performed by: ANESTHESIOLOGY

## 2024-02-01 PROCEDURE — 160025 RECOVERY II MINUTES (STATS): Performed by: OBSTETRICS & GYNECOLOGY

## 2024-02-01 PROCEDURE — 160035 HCHG PACU - 1ST 60 MINS PHASE I: Performed by: OBSTETRICS & GYNECOLOGY

## 2024-02-01 PROCEDURE — 700101 HCHG RX REV CODE 250: Performed by: OBSTETRICS & GYNECOLOGY

## 2024-02-01 PROCEDURE — 700101 HCHG RX REV CODE 250: Performed by: ANESTHESIOLOGY

## 2024-02-01 PROCEDURE — 160002 HCHG RECOVERY MINUTES (STAT): Performed by: OBSTETRICS & GYNECOLOGY

## 2024-02-01 PROCEDURE — 700111 HCHG RX REV CODE 636 W/ 250 OVERRIDE (IP): Mod: JZ | Performed by: ANESTHESIOLOGY

## 2024-02-01 RX ORDER — OXYCODONE HCL 5 MG/5 ML
5 SOLUTION, ORAL ORAL
Status: COMPLETED | OUTPATIENT
Start: 2024-02-01 | End: 2024-02-01

## 2024-02-01 RX ORDER — HYDROMORPHONE HYDROCHLORIDE 1 MG/ML
0.4 INJECTION, SOLUTION INTRAMUSCULAR; INTRAVENOUS; SUBCUTANEOUS
Status: DISCONTINUED | OUTPATIENT
Start: 2024-02-01 | End: 2024-02-01 | Stop reason: HOSPADM

## 2024-02-01 RX ORDER — OXYCODONE HCL 5 MG/5 ML
10 SOLUTION, ORAL ORAL
Status: COMPLETED | OUTPATIENT
Start: 2024-02-01 | End: 2024-02-01

## 2024-02-01 RX ORDER — KETOROLAC TROMETHAMINE 30 MG/ML
INJECTION, SOLUTION INTRAMUSCULAR; INTRAVENOUS PRN
Status: DISCONTINUED | OUTPATIENT
Start: 2024-02-01 | End: 2024-02-01 | Stop reason: SURG

## 2024-02-01 RX ORDER — SODIUM CHLORIDE, SODIUM LACTATE, POTASSIUM CHLORIDE, CALCIUM CHLORIDE 600; 310; 30; 20 MG/100ML; MG/100ML; MG/100ML; MG/100ML
INJECTION, SOLUTION INTRAVENOUS CONTINUOUS
Status: DISCONTINUED | OUTPATIENT
Start: 2024-02-01 | End: 2024-02-01 | Stop reason: HOSPADM

## 2024-02-01 RX ORDER — DEXAMETHASONE SODIUM PHOSPHATE 4 MG/ML
INJECTION, SOLUTION INTRA-ARTICULAR; INTRALESIONAL; INTRAMUSCULAR; INTRAVENOUS; SOFT TISSUE PRN
Status: DISCONTINUED | OUTPATIENT
Start: 2024-02-01 | End: 2024-02-01 | Stop reason: SURG

## 2024-02-01 RX ORDER — HYDROMORPHONE HYDROCHLORIDE 1 MG/ML
0.2 INJECTION, SOLUTION INTRAMUSCULAR; INTRAVENOUS; SUBCUTANEOUS
Status: DISCONTINUED | OUTPATIENT
Start: 2024-02-01 | End: 2024-02-01 | Stop reason: HOSPADM

## 2024-02-01 RX ORDER — METOCLOPRAMIDE HYDROCHLORIDE 5 MG/ML
INJECTION INTRAMUSCULAR; INTRAVENOUS PRN
Status: DISCONTINUED | OUTPATIENT
Start: 2024-02-01 | End: 2024-02-01 | Stop reason: SURG

## 2024-02-01 RX ORDER — LIDOCAINE HYDROCHLORIDE 20 MG/ML
INJECTION, SOLUTION EPIDURAL; INFILTRATION; INTRACAUDAL; PERINEURAL PRN
Status: DISCONTINUED | OUTPATIENT
Start: 2024-02-01 | End: 2024-02-01 | Stop reason: SURG

## 2024-02-01 RX ORDER — DIPHENHYDRAMINE HYDROCHLORIDE 50 MG/ML
12.5 INJECTION INTRAMUSCULAR; INTRAVENOUS
Status: DISCONTINUED | OUTPATIENT
Start: 2024-02-01 | End: 2024-02-01 | Stop reason: HOSPADM

## 2024-02-01 RX ORDER — BUPIVACAINE HYDROCHLORIDE 2.5 MG/ML
INJECTION, SOLUTION EPIDURAL; INFILTRATION; INTRACAUDAL
Status: DISCONTINUED
Start: 2024-02-01 | End: 2024-02-01 | Stop reason: HOSPADM

## 2024-02-01 RX ORDER — CEFAZOLIN SODIUM 1 G/3ML
INJECTION, POWDER, FOR SOLUTION INTRAMUSCULAR; INTRAVENOUS PRN
Status: DISCONTINUED | OUTPATIENT
Start: 2024-02-01 | End: 2024-02-01 | Stop reason: SURG

## 2024-02-01 RX ORDER — HALOPERIDOL 5 MG/ML
1 INJECTION INTRAMUSCULAR
Status: DISCONTINUED | OUTPATIENT
Start: 2024-02-01 | End: 2024-02-01 | Stop reason: HOSPADM

## 2024-02-01 RX ORDER — ONDANSETRON 2 MG/ML
INJECTION INTRAMUSCULAR; INTRAVENOUS PRN
Status: DISCONTINUED | OUTPATIENT
Start: 2024-02-01 | End: 2024-02-01 | Stop reason: SURG

## 2024-02-01 RX ORDER — MEPERIDINE HYDROCHLORIDE 25 MG/ML
6.25 INJECTION INTRAMUSCULAR; INTRAVENOUS; SUBCUTANEOUS
Status: DISCONTINUED | OUTPATIENT
Start: 2024-02-01 | End: 2024-02-01 | Stop reason: HOSPADM

## 2024-02-01 RX ORDER — EPINEPHRINE 1 MG/ML(1)
AMPUL (ML) INJECTION
Status: DISCONTINUED
Start: 2024-02-01 | End: 2024-02-01 | Stop reason: HOSPADM

## 2024-02-01 RX ORDER — BUPIVACAINE HYDROCHLORIDE AND EPINEPHRINE 2.5; 5 MG/ML; UG/ML
INJECTION, SOLUTION EPIDURAL; INFILTRATION; INTRACAUDAL; PERINEURAL
Status: DISCONTINUED | OUTPATIENT
Start: 2024-02-01 | End: 2024-02-01 | Stop reason: HOSPADM

## 2024-02-01 RX ORDER — SODIUM CHLORIDE, SODIUM LACTATE, POTASSIUM CHLORIDE, CALCIUM CHLORIDE 600; 310; 30; 20 MG/100ML; MG/100ML; MG/100ML; MG/100ML
INJECTION, SOLUTION INTRAVENOUS
Status: DISCONTINUED | OUTPATIENT
Start: 2024-02-01 | End: 2024-02-01 | Stop reason: SURG

## 2024-02-01 RX ORDER — ONDANSETRON 2 MG/ML
4 INJECTION INTRAMUSCULAR; INTRAVENOUS
Status: COMPLETED | OUTPATIENT
Start: 2024-02-01 | End: 2024-02-01

## 2024-02-01 RX ORDER — HYDROMORPHONE HYDROCHLORIDE 1 MG/ML
0.1 INJECTION, SOLUTION INTRAMUSCULAR; INTRAVENOUS; SUBCUTANEOUS
Status: DISCONTINUED | OUTPATIENT
Start: 2024-02-01 | End: 2024-02-01 | Stop reason: HOSPADM

## 2024-02-01 RX ORDER — MIDAZOLAM HYDROCHLORIDE 1 MG/ML
INJECTION INTRAMUSCULAR; INTRAVENOUS PRN
Status: DISCONTINUED | OUTPATIENT
Start: 2024-02-01 | End: 2024-02-01 | Stop reason: SURG

## 2024-02-01 RX ADMIN — DEXAMETHASONE SODIUM PHOSPHATE 8 MG: 4 INJECTION INTRA-ARTICULAR; INTRALESIONAL; INTRAMUSCULAR; INTRAVENOUS; SOFT TISSUE at 15:25

## 2024-02-01 RX ADMIN — FENTANYL CITRATE 50 MCG: 50 INJECTION, SOLUTION INTRAMUSCULAR; INTRAVENOUS at 16:02

## 2024-02-01 RX ADMIN — MIDAZOLAM HYDROCHLORIDE 2 MG: 1 INJECTION, SOLUTION INTRAMUSCULAR; INTRAVENOUS at 15:11

## 2024-02-01 RX ADMIN — CEFAZOLIN 2 G: 1 INJECTION, POWDER, FOR SOLUTION INTRAMUSCULAR; INTRAVENOUS at 15:20

## 2024-02-01 RX ADMIN — FENTANYL CITRATE 50 MCG: 50 INJECTION, SOLUTION INTRAMUSCULAR; INTRAVENOUS at 15:14

## 2024-02-01 RX ADMIN — OXYCODONE HYDROCHLORIDE 10 MG: 5 SOLUTION ORAL at 16:55

## 2024-02-01 RX ADMIN — LIDOCAINE HYDROCHLORIDE 100 MG: 20 INJECTION, SOLUTION EPIDURAL; INFILTRATION; INTRACAUDAL at 15:14

## 2024-02-01 RX ADMIN — ONDANSETRON 4 MG: 2 INJECTION INTRAMUSCULAR; INTRAVENOUS at 16:40

## 2024-02-01 RX ADMIN — METOCLOPRAMIDE 10 MG: 5 INJECTION, SOLUTION INTRAMUSCULAR; INTRAVENOUS at 15:47

## 2024-02-01 RX ADMIN — PROPOFOL 150 MCG/KG/MIN: 10 INJECTION, EMULSION INTRAVENOUS at 15:25

## 2024-02-01 RX ADMIN — FENTANYL CITRATE 25 MCG: 50 INJECTION, SOLUTION INTRAMUSCULAR; INTRAVENOUS at 16:44

## 2024-02-01 RX ADMIN — KETOROLAC TROMETHAMINE 30 MG: 30 INJECTION, SOLUTION INTRAMUSCULAR; INTRAVENOUS at 15:47

## 2024-02-01 RX ADMIN — FENTANYL CITRATE 50 MCG: 50 INJECTION, SOLUTION INTRAMUSCULAR; INTRAVENOUS at 16:59

## 2024-02-01 RX ADMIN — SODIUM CHLORIDE, POTASSIUM CHLORIDE, SODIUM LACTATE AND CALCIUM CHLORIDE: 600; 310; 30; 20 INJECTION, SOLUTION INTRAVENOUS at 15:11

## 2024-02-01 RX ADMIN — PROPOFOL 200 MG: 10 INJECTION, EMULSION INTRAVENOUS at 15:14

## 2024-02-01 RX ADMIN — ONDANSETRON 4 MG: 2 INJECTION INTRAMUSCULAR; INTRAVENOUS at 16:13

## 2024-02-01 RX ADMIN — FENTANYL CITRATE 50 MCG: 50 INJECTION, SOLUTION INTRAMUSCULAR; INTRAVENOUS at 15:50

## 2024-02-01 ASSESSMENT — PAIN DESCRIPTION - PAIN TYPE
TYPE: SURGICAL PAIN

## 2024-02-01 ASSESSMENT — PAIN SCALES - GENERAL: PAIN_LEVEL: 3

## 2024-02-01 NOTE — ANESTHESIA PREPROCEDURE EVALUATION
Case: [id number] Date/Time: 02/01/24 8628    Procedures:       PELVIC EXAM UNDER ANESTHESIA, PELVISCOPY, LYSIS OF ADHESIONS, LEFT OVARIAN CYSTECTOMY, POSSIBLE LEFT OOPHORECTOMY, CYSTOSCOPY, DIAGNOSTIC AND OPERATIVE HYSTEROSCOPY, EXCISION OF ENDOMETRIAL POLYP/FIBROID, ENDOMETRIAL CURETTAGE WITH OSCILLATING CUTTING BLADES AND ANY OTHER MEDICALLY NECESSARY PROCEDURES      PELVISCOPY      EXCISION, CYST, OVARY      CYSTOSCOPY    Pre-op diagnosis: ABNORMAL UTERINE BLEEDING, DYSMENORRHEA, LEFT OVARIAN CYST    Location: [facility] ROOM 25 / SURGERY SAME DAY [facility]    Surgeons: Dottie Saldaña M.D.          39yo female c h/o mild asthma, nicotine vaping  Relevant Problems   No relevant active problems       Physical Exam    Airway   Mallampati: I  TM distance: >3 FB  Neck ROM: full       Cardiovascular - normal exam  Rhythm: regular  Rate: normal  (-) murmur     Dental - normal exam           Pulmonary - normal exam  Breath sounds clear to auscultation     Abdominal    Neurological - normal exam                   Anesthesia Plan    ASA 2       Plan - general       Airway plan will be ETT          Induction: intravenous    Postoperative Plan: Postoperative administration of opioids is intended.    Pertinent diagnostic labs and testing reviewed    Informed Consent:    Anesthetic plan and risks discussed with patient.    Use of blood products discussed with: patient whom consented to blood products.

## 2024-02-01 NOTE — OR SURGEON
Immediate Post OP Note    PreOp Diagnosis:     1.  Abnormal uterine bleeding.  2.  Dysmenorrhea.  3.  Left ovarian cyst.  4.  Desires conservative surgical management.      PostOp Diagnosis: as above      Procedure(s):  PELVIC EXAM UNDER ANESTHESIA, PELVISCOPY, LYSIS OF ADHESIONS, LEFT OVARIAN CYSTECTOMY, PLACEMENT OF SURGICEL,  DIAGNOSTIC AND OPERATIVE HYSTEROSCOPY, EXCISION OF ENDOMETRIAL POLYP AND ENDOMETRIAL CURETTAGE WITH OSCILLATING CUTTING BLADE/MYOSRE    Surgeon(s):  Dottie Saldaña M.D.    Assistant:  Felicity Dubon MD    Anesthesiologist/Type of Anesthesia:  Anesthesiologist: Eladia Richardson M.D./GUANAKO and local    Surgical Staff:  Circulator: Aileen Guerrero R.N.  Scrub Person: Emi Caruso    Specimens removed if any:   Left ovarian cyst.  2.  Endometrial curettings.    Estimated Blood Loss: 10 cc    Findings: EUA - uterus is AV mobile and 8 weeks size.  No obvious adnexal masses.      Laparoscopic findings: uterus with a 3 cm anterior fundal subserosal fibroid.  Normal bilateral tubes.  Normal right ovary.  Bilateral ureters are visualized.  Left ovary with a 5 cm serous fluid filled ovarian cyst.  Normal appendix and upper abdomen.      Hysteroscopic findings: plush endometrium noted.  Bilateral tubal ostia are visualized.  Endometrial polypectomy and endometrial curettage performed.  No obvious submucosal fibroids seen.    Complications: None apparent.        2/1/2024 2:50 PM Dottie Saldaña M.D.

## 2024-02-01 NOTE — ANESTHESIA PROCEDURE NOTES
Airway    Date/Time: 2/1/2024 3:15 PM    Performed by: Eladia Richardson M.D.  Authorized by: Eladia Richardson M.D.    Location:  OR  Urgency:  Elective  Difficult Airway: No    Indications for Airway Management:  Anesthesia      Spontaneous Ventilation: absent    Sedation Level:  Deep  Preoxygenated: Yes    Patient Position:  Sniffing  Mask Difficulty Assessment:  1 - vent by mask  Final Airway Type:  Endotracheal airway  Final Endotracheal Airway:  ETT  Cuffed: Yes    Technique Used for Successful ETT Placement:  Direct laryngoscopy    Insertion Site:  Oral  Blade Type:  Cristal  Laryngoscope Blade/Videolaryngoscope Blade Size:  3  ETT Size (mm):  7.0  Measured from:  Teeth  ETT to Teeth (cm):  21  Placement Verified by: auscultation and capnometry    Cormack-Lehane Classification:  Grade I - full view of glottis  Number of Attempts at Approach:  1  Number of Other Approaches Attempted:  0

## 2024-02-01 NOTE — H&P
DATE OF ADMISSION:  2024     PREOPERATIVE DIAGNOSES:  1.  Abnormal uterine bleeding.  2.  Dysmenorrhea.  3.  Left ovarian cyst.  4.  Desires conservative surgical management.     PLANNED PROCEDURE:  Pelvic examination under anesthesia, pelviscopy, lysis of   adhesions, left ovarian cystectomy, possible oophorectomy, diagnostic and   operative hysteroscopy, excision of endometrial polyp/submucosal   fibroid/endometrial curettage with the MyoSure/oscillating cutting blade, and   other medically necessary procedures.     HISTORY OF PRESENT ILLNESS:  The patient is a 38-year-old para 0-1-2-1, not   currently sexually active woman with abnormal uterine bleeding and   dysmenorrhea, who desires conservative surgical management.  The patient   underwent a pelvic ultrasound on 12/10/2023, which demonstrated that her   uterus measured 3.83x8.57x5.43 cm.  The uterine myometrium appeared   inhomogeneous.  There was a small fibroid measuring 5 mm in the posterior   uterine wall.  No other discrete measurable uterine masses were noted.  The   endometrial stripe measured 0.3 cm.  The right ovary measured 2.44x1.54x2 cm.    The left ovary measured 5.47x3.82x5.07 cm.  There was a left ovarian cyst   visualized measuring 4.5x3.2x4.5 cm.  The patient desires conservative   surgical management.  The risks, benefits and alternatives of the above   procedure were discussed with the patient and she agreed to proceed.     PAST MEDICAL HISTORY:  1.  ADHD.  2.  Bipolar disorder.  3.  Interstitial cystitis.  4.  Left ovarian cyst.  5.  Abnormal uterine bleeding.     PAST SURGICAL HISTORY:  1.   x1 in , ruptured ovarian cyst.  2.  Ectopic pregnancy.     FAMILY HISTORY:  Noncontributory.     REVIEW OF SYSTEMS:  Negative.     SOCIAL HISTORY:  She denies alcohol, tobacco or drugs of abuse.  She does   vape.     GYNECOLOGIC HISTORY:  She has a history of STI.  She denies PID, abnormal Pap   smears or HSV.     PAST OBSTETRICS  HISTORY:  1.  Primary  at 35 weeks' gestation.  2.  SAB.  No D and E.  3.  Ectopic pregnancy.  She is unsure if she had her tubes removed or not.     MEDICATIONS:  1.  Lamotrigine.  2.  Methylphenidate.  3.  Hydroxyzine.  4.  Medroxyprogesterone.  5.  Previously, ___ patch.     PHYSICAL EXAMINATION:  VITAL SIGNS:  Blood pressure 119/83, pulse 80, temperature 98.2, BMI 26.  GENERAL:  Alert, awake and oriented x3, no acute distress.  LUNGS:  Clear to auscultation bilaterally.  HEART:  Regular rate and rhythm.  No murmurs, rubs or gallops.  S1, S2 intact.  ABDOMEN:  Soft and nontender.  EXTREMITIES:  No clubbing, cyanosis or edema.  GENITOURINARY:  Deferred.     PREOPERATIVE LABORATORY DATA:  Preoperative CBC with diff and platelets:    White count 8.1, hemoglobin 14.1, hematocrit 39.1, platelets 413.    Comprehensive metabolic panel and qualitative hCG are pending.     IMAGING:  Preoperative EKG performed on 2024 demonstrated sinus rhythm.    Chest x-ray, no active disease.     ASSESSMENT AND PLAN:  A 38-year-old para 0-1-2-1, not currently sexually   active woman with an unsure last menstrual period and who has abnormal uterine   bleeding and dysmenorrhea as well as a left ovarian cyst, who desires   conservative surgical management.  We will proceed to the operating room for a   pelvic examination under anesthesia, pelviscopy, lysis of adhesions, left   ovarian cystectomy, possible oophorectomy, diagnostic and operative   hysteroscopy, excision of endometrial polyp/submucosal fibroid/endometrial   curettage with the MyoSure/oscillating cutting blade, and other medically   necessary procedures.        ______________________________  MD JOHNNIE Olivas/KRISTIAN/JACK    DD:  2024 17:03  DT:  2024 18:47    Job#:  [id number]

## 2024-02-02 LAB — PATHOLOGY CONSULT NOTE: NORMAL

## 2024-02-02 NOTE — OP REPORT
DATE OF SERVICE:  02/01/2024     PREOPERATIVE DIAGNOSES:  1.  Abnormal uterine bleeding.  2.  Dysmenorrhea.  3.  Left ovarian cyst.  4.  Desires conservative surgical management.     POSTOPERATIVE DIAGNOSES:    1.  Abnormal uterine bleeding.  2.  Dysmenorrhea.  3.  Left ovarian cyst.  4.  Desires conservative surgical management.     PROCEDURES:  Pelvic examination under anesthesia, pelviscopy, left ovarian   cystectomy, placement of Surgicel, diagnostic and operative hysteroscopy,   excision of endometrial polyp and endometrial curettage with the oscillating   cutting blade/MyoSure.     SURGEON:  Dottie Saldaña MD     ASSISTANT:  Evelina Dubon MD     ANESTHESIOLOGIST:  Eladia Richardson MD     ANESTHESIA:  General endotracheal tube anesthesia and local anesthetic.     SPECIMENS:  Left ovarian cyst and endometrial curettings.     ESTIMATED BLOOD LOSS:  10 mL     FINDINGS:  On examination under anesthesia, her uterus is anteverted, mobile   and 8 week size.  There were no obvious adnexal masses palpated.     LAPAROSCOPIC FINDINGS:  Uterus with a 3 cm anterior fundal subserosal fibroid.    Normal bilateral fallopian tubes.  Normal right ovary.  Left ovary with a 5   cm serous fluid-filled ovarian cyst.  Bilateral ureters are visualized.    Normal appendix and upper abdomen.     COMPLICATIONS:  None apparent.     INDICATIONS FOR THE PROCEDURE:  The patient is a 38-year-old para 0-1-2-1, not   currently sexually active woman with abnormal uterine bleeding and a left   ovarian cyst measuring 4.5x3.2x4.5 cm.     DETAILS OF THE PROCEDURE:  The patient was taken to the operating room where   she underwent general endotracheal tube anesthesia.  She was then placed in   the dorsal lithotomy position in the [facility].  A pelvic   examination under anesthesia was performed and the findings are as documented   above.     The patient was then prepped and draped in the dorsal lithotomy position.  A   Duran  catheter was placed in her urinary bladder.  Medium Graves speculum was   inserted into the vagina.  The cervix was visualized and the anterior lip of   the cervix was grasped with an Allis clamp and the endocervix was dilated to   allow an 8 INO manipulator to be placed within the uterine cavity.  The INO   manipulator was placed without difficulty.     Attention turned to the laparoscopic portion of the procedure.  A 10 mm   infraumbilical skin incision was made with a scalpel after the instillation of   0.25% Marcaine with epinephrine.  The incision was carried down to the level   of the fascia.  The fascia was grasped with Kocher clamps and the fascia was   opened via blunt dissection.  The S retractors were placed within this   incision and intraabdominal entry was confirmed.  The Tanesha trocar was   inserted under direct visualization.  The CO2 gas was connected and the   opening pressure was 8 mmHg.  The patient was placed in Trendelenburg.  The   pelvis and upper abdomen were explored and the findings are as documented   above.  A second trocar site was identified in her previous    incision.  A 5 mm skin incision was made with a scalpel after the instillation   of 0.25% Marcaine with epinephrine.  The 5 mm trocar was inserted under   direct visualization.  The uterus was elevated and deviated bilaterally to   visualize the fallopian tubes and ovaries.  The bilateral ureters were   visualized.  There was no evidence of pelvic adhesive disease or endometriosis   implants.  The right ovary was grasped with the Prestige clamp and brought to   the midline.  Using the LigaSure, an ovarian cystectomy was performed.  The   ovarian cyst was cauterized and serous fluid was noted to be draining from the   ovarian cyst.  An ovarian cystectomy was performed and the ovarian cyst   tissue was delivered through the umbilical trocar site and sent to pathology.    The remaining portion of the ovary was irrigated  with saline and small areas   of bleeding were cauterized with electrocautery.  The remaining portion of the   ovary appeared hemostatic.  I applied Surgicel within the ovary and the ovary   appeared hemostatic.  The trocars were removed under direct visualization.    The umbilical fascia was identified, grasped with Kocher clamps and   reapproximated with 0 Vicryl using a figure-of-X suture.  The subcutaneous   tissues were reapproximated using 0 Vicryl in a figure-of-X fashion.  The skin   incisions were closed with 4-0 Monocryl in a subcuticular fashion.  The   incisions were dressed with benzoin, Steri-Strips, 2x2 and Tegaderm.     The patient was then placed in high lithotomy.  The INO manipulator was   removed from the patient's uterine cavity.  The speculum was inserted.  The   cervix was visualized and grasped with a single tooth tenaculum.  A   paracervical block was performed using 10 mL of 0.25% Marcaine with   epinephrine.  The endocervix was dilated to allow the MyoSure Lite camera to   be inserted.  The MyoSure Lite camera was inserted.  The uterine cavity was   visualized and notable for a plush endometrium with likely polyps and   endometrial polypectomy and endometrial curettage was performed using the   MyoSure Lite.  The tissue was sent to pathology.  Before and after pictures   were taken.  The hysteroscope was removed.  The tenaculum was removed from the   anterior lip of the cervix.  The cervix was made hemostatic with silver   nitrate and direct pressure.  The speculum was removed.  The patient was taken   out of dorsal lithotomy position.  She was then extubated and taken to the   PACU in stable condition.  Sponge, lap, needle and instrument counts were   correct x2.        ______________________________  MD JOHNNIE Olivas/JACKIE    DD:  02/01/2024 16:29  DT:  02/01/2024 17:14    Job#:  [id number]

## 2024-02-02 NOTE — ANESTHESIA POSTPROCEDURE EVALUATION
Patient: Madelyn Fernandez    Procedure Summary       Date: 02/01/24 Room / Location: [facility] ROOM 25 / SURGERY SAME DAY [facility]    Anesthesia Start: 1511 Anesthesia Stop: 1626    Procedures:       PELVIC EXAM UNDER ANESTHESIA, PELVISCOPY, LEFT OVARIAN CYSTECTOMY, CYSTOSCOPY, DIAGNOSTIC AND OPERATIVE HYSTEROSCOPY,ENDOMETRIAL CURETTAGE WITH OSCILLATING CUTTING BLADES (Pelvis)      PELVISCOPY (Pelvis)      EXCISION, CYST, OVARY (Left: Pelvis)      CYSTOSCOPY (Pelvis) Diagnosis: (ABNORMAL UTERINE BLEEDING, DYSMENORRHEA, LEFT OVARIAN CYST)    Surgeons: Dottie Saldaña M.D. Responsible Provider: Eladia Richardson M.D.    Anesthesia Type: general ASA Status: 2            Final Anesthesia Type: general  Last vitals  BP   Blood Pressure: 130/85    Temp   36.3 °C (97.3 °F)    Pulse   84   Resp   16    SpO2   97 %      Anesthesia Post Evaluation    Patient location during evaluation: PACU  Patient participation: complete - patient participated  Level of consciousness: awake and alert  Pain score: 3    Airway patency: patent  Anesthetic complications: no  Cardiovascular status: hemodynamically stable  Respiratory status: acceptable  Hydration status: euvolemic    PONV: none          No notable events documented.     Nurse Pain Score: 4 (NPRS)

## 2024-02-02 NOTE — ANESTHESIA TIME REPORT
Anesthesia Start and Stop Event Times       Date Time Event    2/1/2024 1501 Ready for Procedure     1511 Anesthesia Start     1626 Anesthesia Stop          Responsible Staff  02/01/24      Name Role Begin End    Eladia Richardson M.D. Anesth 1511 1626          Overtime Reason:  overtime    Comments:

## 2024-02-02 NOTE — DISCHARGE INSTRUCTIONS
HOME CARE INSTRUCTIONS    ACTIVITY: Rest and take it easy for the first 24 hours.  A responsible adult is recommended to remain with you during that time.  It is normal to feel sleepy.  We encourage you to not do anything that requires balance, judgment or coordination.    FOR 24 HOURS DO NOT:  Drive, operate machinery or run household appliances.  Drink beer or alcoholic beverages.  Make important decisions or sign legal documents.    SPECIAL INSTRUCTIONS: : Call for a temp >100.4, heavy vaginal bleeding, foul smelling discharge, or if her incision oozes blood, pus, or fluid.  No driving x 2 weeks or while on narcotics, no lifting >20 pounds x 4 weeks, and pelvic rest x 6 weeks.  Ambulate TID.  Call for signs/symptoms of DVT/PE.     DIET: To avoid nausea, slowly advance diet as tolerated, avoiding spicy or greasy foods for the first day.  Add more substantial food to your diet according to your physician's instructions.  Babies can be fed formula or breast milk as soon as they are hungry.  INCREASE FLUIDS AND FIBER TO AVOID CONSTIPATION.    MEDICATIONS: Resume taking daily medication.  Take prescribed pain medication with food.  If no medication is prescribed, you may take non-aspirin pain medication if needed.  PAIN MEDICATION CAN BE VERY CONSTIPATING.  Take a stool softener or laxative such as senokot, pericolace, or milk of magnesia if needed.    Last pain medication given: Toradol (like ibuprofen) given at 4 pm. Oxycodone given at 5 pm.         A follow-up appointment should be arranged with your doctor; call to schedule.    You should CALL YOUR PHYSICIAN if you develop:  Fever greater than 101 degrees F.  Pain not relieved by medication, or persistent nausea or vomiting.  Excessive bleeding (blood soaking through dressing) or unexpected drainage from the wound.  Extreme redness or swelling around the incision site, drainage of pus or foul smelling drainage.  Inability to urinate or empty your bladder within 8  hours.  Problems with breathing or chest pain.    You should call 911 if you develop problems with breathing or chest pain.  If you are unable to contact your doctor or surgical center, you should go to the nearest emergency room or urgent care center.  Physician's telephone #: Dr. Abraham [phone]     MILD FLU-LIKE SYMPTOMS ARE NORMAL.  YOU MAY EXPERIENCE GENERALIZED MUSCLE ACHES, THROAT IRRITATION, HEADACHE AND/OR SOME NAUSEA.    If any questions arise, call your doctor.  If your doctor is not available, please feel free to call the Surgical Center at [phone].  The Center is open Monday through Friday from 7AM to 7PM.      A registered nurse may call you a few days after your surgery to see how you are doing after your procedure.    You may also receive a survey in the mail within the next two weeks and we ask that you take a few moments to complete the survey and return it to us.  Our goal is to provide you with very good care and we value your comments.     Depression / Suicide Risk    As you are discharged from this [facility] facility, it is important to learn how to keep safe from harming yourself.    Recognize the warning signs:  Abrupt changes in personality, positive or negative- including increase in energy   Giving away possessions  Change in eating patterns- significant weight changes-  positive or negative  Change in sleeping patterns- unable to sleep or sleeping all the time   Unwillingness or inability to communicate  Depression  Unusual sadness, discouragement and loneliness  Talk of wanting to die  Neglect of personal appearance   Rebelliousness- reckless behavior  Withdrawal from people/activities they love  Confusion- inability to concentrate     If you or a loved one observes any of these behaviors or has concerns about self-harm, here's what you can do:  Talk about it- your feelings and reasons for harming yourself  Remove any means that you might use to hurt yourself (examples:  pills, rope, extension cords, firearm)  Get professional help from the community (Mental Health, Substance Abuse, psychological counseling)  Do not be alone:Call your Safe Contact- someone whom you trust who will be there for you.  Call your local CRISIS HOTLINE 571-7499 or 883-033-4406  Call your local Children's Mobile Crisis Response Team [place] (815) 055-2026 or www.[place]  Call the toll free National Suicide Prevention Hotlines   National Suicide Prevention Lifeline 584-586-AZOP (7193)  [facility] 800-SUICIDE (440-0105)    I acknowledge receipt and understanding of these Home Care instructions.

## 2024-02-02 NOTE — OR NURSING
1623 Patient arrived from OR to PACU 10. Connected to monitor and report received from anesthesia and RN. VSS. 6 L 02 via mask. Breaths calm, even, unlabored.     Gauze and Tegaderm dressings to abdomen; clean, dry, intact. Sintia pad in place; no drainage noted      1640: Zofran given per mar    1644: Pt medicated for pain 6/10 per mar    1655: Oral pain medication given for pain 7/10 per mar    1659: Pt medicated for pain 7/10 per mar      1720: Pt's spouse brought to bedside. Pt up to restroom: voided and dressed    1743: Discharge instructions provided to pt and pt's spouse at bedside.    1749: Pt meets discharge criteria. PIV removed intact. Pt escorted out with all personal belongings via wheelchair by RN.

## 2024-02-07 ENCOUNTER — PHARMACY VISIT (OUTPATIENT)
Dept: PHARMACY | Facility: MEDICAL CENTER | Age: 39
End: 2024-02-07
Payer: COMMERCIAL

## 2024-02-21 ENCOUNTER — PHARMACY VISIT (OUTPATIENT)
Dept: PHARMACY | Facility: MEDICAL CENTER | Age: 39
End: 2024-02-21
Payer: COMMERCIAL

## 2024-02-21 PROCEDURE — RXMED WILLOW AMBULATORY MEDICATION CHARGE: Performed by: PSYCHIATRY & NEUROLOGY

## 2024-02-21 RX ORDER — BUPROPION HYDROCHLORIDE 150 MG/1
TABLET ORAL
Qty: 30 TABLET | Refills: 2 | OUTPATIENT
Start: 2024-02-21

## 2024-03-15 ENCOUNTER — PHARMACY VISIT (OUTPATIENT)
Dept: PHARMACY | Facility: MEDICAL CENTER | Age: 39
End: 2024-03-15
Payer: COMMERCIAL

## 2024-03-15 PROCEDURE — RXMED WILLOW AMBULATORY MEDICATION CHARGE: Performed by: PSYCHIATRY & NEUROLOGY

## 2024-03-15 RX ORDER — BUPROPION HYDROCHLORIDE 150 MG/1
TABLET ORAL
Qty: 30 TABLET | Refills: 2 | OUTPATIENT
Start: 2024-03-15

## 2024-03-25 ENCOUNTER — APPOINTMENT (OUTPATIENT)
Dept: INTERNAL MEDICINE | Facility: OTHER | Age: 39
End: 2024-03-25
Payer: COMMERCIAL

## 2024-04-16 PROCEDURE — RXMED WILLOW AMBULATORY MEDICATION CHARGE: Performed by: PSYCHIATRY & NEUROLOGY

## 2024-04-17 ENCOUNTER — PHARMACY VISIT (OUTPATIENT)
Dept: PHARMACY | Facility: MEDICAL CENTER | Age: 39
End: 2024-04-17
Payer: COMMERCIAL

## 2024-05-09 ENCOUNTER — APPOINTMENT (OUTPATIENT)
Dept: MEDICAL GROUP | Facility: IMAGING CENTER | Age: 39
End: 2024-05-09
Payer: COMMERCIAL

## 2024-05-14 PROCEDURE — RXMED WILLOW AMBULATORY MEDICATION CHARGE: Performed by: PSYCHIATRY & NEUROLOGY

## 2024-05-16 ENCOUNTER — APPOINTMENT (OUTPATIENT)
Dept: INTERNAL MEDICINE | Facility: OTHER | Age: 39
End: 2024-05-16
Payer: COMMERCIAL

## 2024-05-21 PROCEDURE — RXMED WILLOW AMBULATORY MEDICATION CHARGE: Performed by: PSYCHIATRY & NEUROLOGY

## 2024-05-23 ENCOUNTER — PHARMACY VISIT (OUTPATIENT)
Dept: PHARMACY | Facility: MEDICAL CENTER | Age: 39
End: 2024-05-23
Payer: COMMERCIAL

## 2024-05-23 PROCEDURE — RXMED WILLOW AMBULATORY MEDICATION CHARGE: Performed by: PSYCHIATRY & NEUROLOGY

## 2024-05-23 RX ORDER — BUPROPION HYDROCHLORIDE 150 MG/1
TABLET ORAL
Qty: 30 TABLET | Refills: 2 | OUTPATIENT
Start: 2024-05-23

## 2024-05-23 RX ORDER — GUANFACINE 1 MG/1
TABLET, EXTENDED RELEASE ORAL
Qty: 30 TABLET | Refills: 2 | OUTPATIENT
Start: 2024-05-23

## 2024-06-13 PROCEDURE — RXMED WILLOW AMBULATORY MEDICATION CHARGE: Performed by: PSYCHIATRY & NEUROLOGY

## 2024-06-24 ENCOUNTER — PHARMACY VISIT (OUTPATIENT)
Dept: PHARMACY | Facility: MEDICAL CENTER | Age: 39
End: 2024-06-24
Payer: COMMERCIAL

## 2024-07-08 PROCEDURE — RXMED WILLOW AMBULATORY MEDICATION CHARGE: Performed by: OBSTETRICS & GYNECOLOGY

## 2024-07-09 ENCOUNTER — PHARMACY VISIT (OUTPATIENT)
Dept: PHARMACY | Facility: MEDICAL CENTER | Age: 39
End: 2024-07-09
Payer: COMMERCIAL

## 2024-07-18 ENCOUNTER — APPOINTMENT (OUTPATIENT)
Dept: INTERNAL MEDICINE | Facility: OTHER | Age: 39
End: 2024-07-18
Payer: COMMERCIAL

## 2024-07-30 ENCOUNTER — PHARMACY VISIT (OUTPATIENT)
Dept: PHARMACY | Facility: MEDICAL CENTER | Age: 39
End: 2024-07-30
Payer: COMMERCIAL

## 2024-07-30 ENCOUNTER — OFFICE VISIT (OUTPATIENT)
Dept: MEDICAL GROUP | Facility: IMAGING CENTER | Age: 39
End: 2024-07-30
Payer: COMMERCIAL

## 2024-07-30 ENCOUNTER — HOSPITAL ENCOUNTER (OUTPATIENT)
Dept: LAB | Facility: MEDICAL CENTER | Age: 39
End: 2024-07-30
Attending: PHYSICIAN ASSISTANT
Payer: COMMERCIAL

## 2024-07-30 ENCOUNTER — HOSPITAL ENCOUNTER (OUTPATIENT)
Facility: MEDICAL CENTER | Age: 39
End: 2024-07-30
Attending: PHYSICIAN ASSISTANT
Payer: COMMERCIAL

## 2024-07-30 VITALS
SYSTOLIC BLOOD PRESSURE: 126 MMHG | RESPIRATION RATE: 16 BRPM | WEIGHT: 150 LBS | HEIGHT: 64 IN | DIASTOLIC BLOOD PRESSURE: 78 MMHG | OXYGEN SATURATION: 100 % | BODY MASS INDEX: 25.61 KG/M2 | TEMPERATURE: 98.2 F | HEART RATE: 76 BPM

## 2024-07-30 DIAGNOSIS — Z23 NEED FOR VACCINATION: ICD-10-CM

## 2024-07-30 DIAGNOSIS — F60.3 BORDERLINE PERSONALITY DISORDER (HCC): ICD-10-CM

## 2024-07-30 DIAGNOSIS — Z11.1 TUBERCULOSIS SCREENING: ICD-10-CM

## 2024-07-30 DIAGNOSIS — Z00.00 WELLNESS EXAMINATION: ICD-10-CM

## 2024-07-30 DIAGNOSIS — Z13.220 SCREENING CHOLESTEROL LEVEL: ICD-10-CM

## 2024-07-30 DIAGNOSIS — Z13.21 ENCOUNTER FOR VITAMIN DEFICIENCY SCREENING: ICD-10-CM

## 2024-07-30 DIAGNOSIS — R05.1 ACUTE COUGH: ICD-10-CM

## 2024-07-30 DIAGNOSIS — N30.01 ACUTE CYSTITIS WITH HEMATURIA: ICD-10-CM

## 2024-07-30 DIAGNOSIS — Z12.83 SKIN CANCER SCREENING: ICD-10-CM

## 2024-07-30 DIAGNOSIS — Z11.4 SCREENING FOR HIV (HUMAN IMMUNODEFICIENCY VIRUS): ICD-10-CM

## 2024-07-30 DIAGNOSIS — J45.20 MILD INTERMITTENT ASTHMA WITHOUT COMPLICATION: ICD-10-CM

## 2024-07-30 DIAGNOSIS — F90.9 ATTENTION DEFICIT HYPERACTIVITY DISORDER (ADHD), UNSPECIFIED ADHD TYPE: ICD-10-CM

## 2024-07-30 PROBLEM — S93.409A SPRAIN OF LATERAL LIGAMENT OF ANKLE JOINT: Status: RESOLVED | Noted: 2023-03-01 | Resolved: 2024-07-30

## 2024-07-30 PROBLEM — R35.0 FREQUENCY OF URINATION: Status: ACTIVE | Noted: 2024-07-30

## 2024-07-30 PROBLEM — O34.29 HX OF UTERINE SURGERY AFFECTING CURRENT PREGNANCY: Status: RESOLVED | Noted: 2022-01-17 | Resolved: 2024-07-30

## 2024-07-30 LAB
APPEARANCE UR: CLEAR
BILIRUB UR STRIP-MCNC: NORMAL MG/DL
CHOLEST SERPL-MCNC: 206 MG/DL (ref 100–199)
COLOR UR AUTO: YELLOW
FASTING STATUS PATIENT QL REPORTED: NORMAL
GLUCOSE UR STRIP.AUTO-MCNC: NORMAL MG/DL
HDLC SERPL-MCNC: 44 MG/DL
HIV 1+2 AB+HIV1 P24 AG SERPL QL IA: NORMAL
KETONES UR STRIP.AUTO-MCNC: NORMAL MG/DL
LDLC SERPL CALC-MCNC: 145 MG/DL
LEUKOCYTE ESTERASE UR QL STRIP.AUTO: NORMAL
NITRITE UR QL STRIP.AUTO: NORMAL
PH UR STRIP.AUTO: 6 [PH] (ref 5–8)
PROT UR QL STRIP: NORMAL MG/DL
RBC UR QL AUTO: NORMAL
SP GR UR STRIP.AUTO: 1.01
TRIGL SERPL-MCNC: 87 MG/DL (ref 0–149)
UROBILINOGEN UR STRIP-MCNC: 1 MG/DL
VIT B12 SERPL-MCNC: 502 PG/ML (ref 211–911)

## 2024-07-30 PROCEDURE — RXMED WILLOW AMBULATORY MEDICATION CHARGE: Performed by: PHYSICIAN ASSISTANT

## 2024-07-30 PROCEDURE — 87389 HIV-1 AG W/HIV-1&-2 AB AG IA: CPT

## 2024-07-30 PROCEDURE — 80061 LIPID PANEL: CPT

## 2024-07-30 PROCEDURE — 36415 COLL VENOUS BLD VENIPUNCTURE: CPT

## 2024-07-30 PROCEDURE — 86480 TB TEST CELL IMMUN MEASURE: CPT

## 2024-07-30 PROCEDURE — 82607 VITAMIN B-12: CPT

## 2024-07-30 RX ORDER — CEPHALEXIN 500 MG/1
500 CAPSULE ORAL 2 TIMES DAILY
Qty: 10 CAPSULE | Refills: 0 | Status: SHIPPED | OUTPATIENT
Start: 2024-07-30

## 2024-07-30 ASSESSMENT — PAIN SCALES - GENERAL: PAINLEVEL: NO PAIN

## 2024-08-01 ENCOUNTER — PHARMACY VISIT (OUTPATIENT)
Dept: PHARMACY | Facility: MEDICAL CENTER | Age: 39
End: 2024-08-01
Payer: COMMERCIAL

## 2024-08-01 PROCEDURE — RXMED WILLOW AMBULATORY MEDICATION CHARGE: Performed by: PSYCHIATRY & NEUROLOGY

## 2024-08-01 RX ORDER — ARIPIPRAZOLE 10 MG/1
TABLET ORAL
Qty: 30 TABLET | Refills: 2 | OUTPATIENT
Start: 2024-08-01

## 2024-08-02 LAB
BACTERIA UR CULT: ABNORMAL
BACTERIA UR CULT: ABNORMAL
GAMMA INTERFERON BACKGROUND BLD IA-ACNC: 0.09 IU/ML
M TB IFN-G BLD-IMP: NEGATIVE
M TB IFN-G CD4+ BCKGRND COR BLD-ACNC: 0.03 IU/ML
MITOGEN IGNF BCKGRD COR BLD-ACNC: >10 IU/ML
QFT TB2 - NIL TBQ2: 0.01 IU/ML
SIGNIFICANT IND 70042: ABNORMAL
SITE SITE: ABNORMAL
SOURCE SOURCE: ABNORMAL

## 2024-08-05 ENCOUNTER — PHARMACY VISIT (OUTPATIENT)
Dept: PHARMACY | Facility: MEDICAL CENTER | Age: 39
End: 2024-08-05
Payer: COMMERCIAL

## 2024-08-05 ENCOUNTER — PATIENT MESSAGE (OUTPATIENT)
Dept: MEDICAL GROUP | Facility: IMAGING CENTER | Age: 39
End: 2024-08-05
Payer: COMMERCIAL

## 2024-08-05 DIAGNOSIS — Z86.19 HISTORY OF HERPES GENITALIS: ICD-10-CM

## 2024-08-05 PROCEDURE — RXMED WILLOW AMBULATORY MEDICATION CHARGE: Performed by: PHYSICIAN ASSISTANT

## 2024-08-05 RX ORDER — VALACYCLOVIR HYDROCHLORIDE 1 G/1
1000 TABLET, FILM COATED ORAL 2 TIMES DAILY
Qty: 20 TABLET | Refills: 0 | Status: SHIPPED | OUTPATIENT
Start: 2024-08-05

## 2024-08-07 ENCOUNTER — APPOINTMENT (OUTPATIENT)
Dept: INTERNAL MEDICINE | Facility: OTHER | Age: 39
End: 2024-08-07
Payer: COMMERCIAL

## 2024-08-10 PROCEDURE — RXMED WILLOW AMBULATORY MEDICATION CHARGE: Performed by: PSYCHIATRY & NEUROLOGY

## 2024-08-16 ENCOUNTER — PHARMACY VISIT (OUTPATIENT)
Dept: PHARMACY | Facility: MEDICAL CENTER | Age: 39
End: 2024-08-16
Payer: COMMERCIAL

## 2024-08-30 ENCOUNTER — PHARMACY VISIT (OUTPATIENT)
Dept: PHARMACY | Facility: MEDICAL CENTER | Age: 39
End: 2024-08-30
Payer: COMMERCIAL

## 2024-08-30 PROCEDURE — RXMED WILLOW AMBULATORY MEDICATION CHARGE: Performed by: PSYCHIATRY & NEUROLOGY

## 2024-09-03 ENCOUNTER — HOSPITAL ENCOUNTER (OUTPATIENT)
Dept: LAB | Facility: MEDICAL CENTER | Age: 39
End: 2024-09-03
Attending: PHYSICIAN ASSISTANT
Payer: MEDICAID

## 2024-09-03 DIAGNOSIS — N39.0 FREQUENT UTI: ICD-10-CM

## 2024-09-03 LAB
APPEARANCE UR: CLEAR
BILIRUB UR QL STRIP.AUTO: NEGATIVE
COLOR UR: YELLOW
GLUCOSE UR STRIP.AUTO-MCNC: NEGATIVE MG/DL
KETONES UR STRIP.AUTO-MCNC: NEGATIVE MG/DL
LEUKOCYTE ESTERASE UR QL STRIP.AUTO: NEGATIVE
MICRO URNS: NORMAL
NITRITE UR QL STRIP.AUTO: NEGATIVE
PH UR STRIP.AUTO: 6 [PH] (ref 5–8)
PROT UR QL STRIP: NEGATIVE MG/DL
RBC UR QL AUTO: NEGATIVE
SP GR UR STRIP.AUTO: 1.01
UROBILINOGEN UR STRIP.AUTO-MCNC: 1 MG/DL

## 2024-09-03 PROCEDURE — 81003 URINALYSIS AUTO W/O SCOPE: CPT

## 2024-09-06 PROCEDURE — RXMED WILLOW AMBULATORY MEDICATION CHARGE: Performed by: PSYCHIATRY & NEUROLOGY

## 2024-09-07 ENCOUNTER — PHARMACY VISIT (OUTPATIENT)
Dept: PHARMACY | Facility: MEDICAL CENTER | Age: 39
End: 2024-09-07
Payer: COMMERCIAL

## 2024-09-12 ENCOUNTER — PHARMACY VISIT (OUTPATIENT)
Dept: PHARMACY | Facility: MEDICAL CENTER | Age: 39
End: 2024-09-12
Payer: COMMERCIAL

## 2024-09-12 PROCEDURE — RXMED WILLOW AMBULATORY MEDICATION CHARGE: Performed by: INTERNAL MEDICINE

## 2024-09-12 RX ORDER — INFLUENZA A VIRUS A/VICTORIA/4897/2022 IVR-238 (H1N1) ANTIGEN (FORMALDEHYDE INACTIVATED), INFLUENZA A VIRUS A/CALIFORNIA/122/2022 SAN-022 (H3N2) ANTIGEN (FORMALDEHYDE INACTIVATED), AND INFLUENZA B VIRUS B/MICHIGAN/01/2021 ANTIGEN (FORMALDEHYDE INACTIVATED) 15; 15; 15 MG/.5ML; MG/.5ML; MG/.5ML
INJECTION, SUSPENSION INTRAMUSCULAR
Qty: 0.5 ML | Refills: 0 | OUTPATIENT
Start: 2024-09-12

## 2024-09-12 RX ORDER — DEXTROAMPHETAMINE SACCHARATE, AMPHETAMINE ASPARTATE, DEXTROAMPHETAMINE SULFATE AND AMPHETAMINE SULFATE 1.25; 1.25; 1.25; 1.25 MG/1; MG/1; MG/1; MG/1
TABLET ORAL
Qty: 7 TABLET | Refills: 0 | Status: CANCELLED | OUTPATIENT
Start: 2024-09-05

## 2024-09-12 RX ORDER — DEXTROAMPHETAMINE SACCHARATE, AMPHETAMINE ASPARTATE, DEXTROAMPHETAMINE SULFATE AND AMPHETAMINE SULFATE 3.75; 3.75; 3.75; 3.75 MG/1; MG/1; MG/1; MG/1
TABLET ORAL
Qty: 14 TABLET | Refills: 0 | Status: CANCELLED | OUTPATIENT
Start: 2024-09-05

## 2024-09-23 ENCOUNTER — APPOINTMENT (OUTPATIENT)
Dept: OCCUPATIONAL MEDICINE | Facility: CLINIC | Age: 39
End: 2024-09-23

## 2024-09-25 PROCEDURE — RXMED WILLOW AMBULATORY MEDICATION CHARGE: Performed by: PSYCHIATRY & NEUROLOGY

## 2024-09-26 ENCOUNTER — PHARMACY VISIT (OUTPATIENT)
Dept: PHARMACY | Facility: MEDICAL CENTER | Age: 39
End: 2024-09-26
Payer: COMMERCIAL

## 2024-10-08 ENCOUNTER — APPOINTMENT (OUTPATIENT)
Dept: MEDICAL GROUP | Facility: IMAGING CENTER | Age: 39
End: 2024-10-08
Payer: MEDICAID

## 2024-10-17 PROCEDURE — RXMED WILLOW AMBULATORY MEDICATION CHARGE: Performed by: PSYCHIATRY & NEUROLOGY

## 2024-10-19 ENCOUNTER — PHARMACY VISIT (OUTPATIENT)
Dept: PHARMACY | Facility: MEDICAL CENTER | Age: 39
End: 2024-10-19
Payer: COMMERCIAL

## 2024-10-19 PROCEDURE — RXMED WILLOW AMBULATORY MEDICATION CHARGE: Performed by: PSYCHIATRY & NEUROLOGY

## 2024-10-27 PROCEDURE — RXMED WILLOW AMBULATORY MEDICATION CHARGE: Performed by: PSYCHIATRY & NEUROLOGY

## 2024-10-28 ENCOUNTER — PHARMACY VISIT (OUTPATIENT)
Dept: PHARMACY | Facility: MEDICAL CENTER | Age: 39
End: 2024-10-28
Payer: COMMERCIAL

## 2024-11-14 PROCEDURE — RXMED WILLOW AMBULATORY MEDICATION CHARGE: Performed by: PSYCHIATRY & NEUROLOGY

## 2024-11-15 ENCOUNTER — PHARMACY VISIT (OUTPATIENT)
Dept: PHARMACY | Facility: MEDICAL CENTER | Age: 39
End: 2024-11-15
Payer: COMMERCIAL

## 2024-11-16 PROCEDURE — RXMED WILLOW AMBULATORY MEDICATION CHARGE: Performed by: PSYCHIATRY & NEUROLOGY

## 2024-11-18 ENCOUNTER — PHARMACY VISIT (OUTPATIENT)
Dept: PHARMACY | Facility: MEDICAL CENTER | Age: 39
End: 2024-11-18
Payer: COMMERCIAL

## 2024-12-04 PROCEDURE — RXMED WILLOW AMBULATORY MEDICATION CHARGE

## 2024-12-06 ENCOUNTER — PHARMACY VISIT (OUTPATIENT)
Dept: PHARMACY | Facility: MEDICAL CENTER | Age: 39
End: 2024-12-06
Payer: COMMERCIAL

## 2024-12-10 PROCEDURE — RXMED WILLOW AMBULATORY MEDICATION CHARGE: Performed by: PSYCHIATRY & NEUROLOGY

## 2024-12-13 ENCOUNTER — PHARMACY VISIT (OUTPATIENT)
Dept: PHARMACY | Facility: MEDICAL CENTER | Age: 39
End: 2024-12-13
Payer: COMMERCIAL

## 2024-12-14 PROCEDURE — RXMED WILLOW AMBULATORY MEDICATION CHARGE: Performed by: PSYCHIATRY & NEUROLOGY

## 2024-12-23 ENCOUNTER — PHARMACY VISIT (OUTPATIENT)
Dept: PHARMACY | Facility: MEDICAL CENTER | Age: 39
End: 2024-12-23
Payer: COMMERCIAL

## 2025-01-07 PROCEDURE — RXMED WILLOW AMBULATORY MEDICATION CHARGE: Performed by: PSYCHIATRY & NEUROLOGY

## 2025-01-08 ENCOUNTER — PHARMACY VISIT (OUTPATIENT)
Dept: PHARMACY | Facility: MEDICAL CENTER | Age: 40
End: 2025-01-08
Payer: COMMERCIAL

## 2025-01-08 PROCEDURE — RXMED WILLOW AMBULATORY MEDICATION CHARGE: Performed by: PSYCHIATRY & NEUROLOGY

## 2025-01-11 ENCOUNTER — PHARMACY VISIT (OUTPATIENT)
Dept: PHARMACY | Facility: MEDICAL CENTER | Age: 40
End: 2025-01-11
Payer: COMMERCIAL

## 2025-01-22 PROCEDURE — RXMED WILLOW AMBULATORY MEDICATION CHARGE: Performed by: PSYCHIATRY & NEUROLOGY

## 2025-01-23 ENCOUNTER — PHARMACY VISIT (OUTPATIENT)
Dept: PHARMACY | Facility: MEDICAL CENTER | Age: 40
End: 2025-01-23
Payer: COMMERCIAL

## 2025-02-25 ENCOUNTER — PHARMACY VISIT (OUTPATIENT)
Dept: PHARMACY | Facility: MEDICAL CENTER | Age: 40
End: 2025-02-25
Payer: COMMERCIAL

## 2025-02-25 PROCEDURE — RXMED WILLOW AMBULATORY MEDICATION CHARGE: Performed by: PSYCHIATRY & NEUROLOGY

## 2025-04-24 ENCOUNTER — APPOINTMENT (OUTPATIENT)
Dept: RADIOLOGY | Facility: MEDICAL CENTER | Age: 40
End: 2025-04-24
Payer: MEDICAID

## 2025-05-07 ENCOUNTER — APPOINTMENT (OUTPATIENT)
Dept: RADIOLOGY | Facility: MEDICAL CENTER | Age: 40
End: 2025-05-07
Attending: STUDENT IN AN ORGANIZED HEALTH CARE EDUCATION/TRAINING PROGRAM
Payer: COMMERCIAL

## 2025-05-07 ENCOUNTER — HOSPITAL ENCOUNTER (EMERGENCY)
Facility: MEDICAL CENTER | Age: 40
End: 2025-05-07
Attending: STUDENT IN AN ORGANIZED HEALTH CARE EDUCATION/TRAINING PROGRAM
Payer: COMMERCIAL

## 2025-05-07 VITALS
HEIGHT: 63 IN | BODY MASS INDEX: 24.41 KG/M2 | WEIGHT: 137.79 LBS | SYSTOLIC BLOOD PRESSURE: 119 MMHG | HEART RATE: 79 BPM | RESPIRATION RATE: 16 BRPM | OXYGEN SATURATION: 98 % | DIASTOLIC BLOOD PRESSURE: 84 MMHG | TEMPERATURE: 97.6 F

## 2025-05-07 DIAGNOSIS — O20.0 ABORTION, THREATENED: ICD-10-CM

## 2025-05-07 DIAGNOSIS — R11.0 NAUSEA: ICD-10-CM

## 2025-05-07 DIAGNOSIS — R10.32 LEFT LOWER QUADRANT ABDOMINAL PAIN: ICD-10-CM

## 2025-05-07 LAB
ALBUMIN SERPL BCP-MCNC: 4.7 G/DL (ref 3.2–4.9)
ALBUMIN/GLOB SERPL: 1.6 G/DL
ALP SERPL-CCNC: 109 U/L (ref 30–99)
ALT SERPL-CCNC: 21 U/L (ref 2–50)
ANION GAP SERPL CALC-SCNC: 14 MMOL/L (ref 7–16)
APPEARANCE UR: ABNORMAL
AST SERPL-CCNC: 18 U/L (ref 12–45)
B-HCG SERPL-ACNC: 129 MIU/ML (ref 0–10)
BACTERIA #/AREA URNS HPF: ABNORMAL /HPF
BACTERIA GENITAL QL WET PREP: NORMAL
BASOPHILS # BLD AUTO: 0.4 % (ref 0–1.8)
BASOPHILS # BLD: 0.04 K/UL (ref 0–0.12)
BILIRUB SERPL-MCNC: <0.2 MG/DL (ref 0.1–1.5)
BILIRUB UR QL STRIP.AUTO: NEGATIVE
BUN SERPL-MCNC: 12 MG/DL (ref 8–22)
CALCIUM ALBUM COR SERPL-MCNC: 8.8 MG/DL (ref 8.5–10.5)
CALCIUM SERPL-MCNC: 9.4 MG/DL (ref 8.5–10.5)
CASTS URNS QL MICRO: ABNORMAL /LPF (ref 0–2)
CHLORIDE SERPL-SCNC: 101 MMOL/L (ref 96–112)
CO2 SERPL-SCNC: 23 MMOL/L (ref 20–33)
COLOR UR: YELLOW
CREAT SERPL-MCNC: 0.64 MG/DL (ref 0.5–1.4)
EKG IMPRESSION: NORMAL
EOSINOPHIL # BLD AUTO: 0.1 K/UL (ref 0–0.51)
EOSINOPHIL NFR BLD: 1 % (ref 0–6.9)
EPITHELIAL CELLS 1715: ABNORMAL /HPF (ref 0–5)
ERYTHROCYTE [DISTWIDTH] IN BLOOD BY AUTOMATED COUNT: 43.3 FL (ref 35.9–50)
GFR SERPLBLD CREATININE-BSD FMLA CKD-EPI: 116 ML/MIN/1.73 M 2
GLOBULIN SER CALC-MCNC: 2.9 G/DL (ref 1.9–3.5)
GLUCOSE SERPL-MCNC: 98 MG/DL (ref 65–99)
GLUCOSE UR STRIP.AUTO-MCNC: NEGATIVE MG/DL
HCG SERPL QL: POSITIVE
HCT VFR BLD AUTO: 40.2 % (ref 37–47)
HGB BLD-MCNC: 13.6 G/DL (ref 12–16)
IMM GRANULOCYTES # BLD AUTO: 0.03 K/UL (ref 0–0.11)
IMM GRANULOCYTES NFR BLD AUTO: 0.3 % (ref 0–0.9)
KETONES UR STRIP.AUTO-MCNC: NEGATIVE MG/DL
LEUKOCYTE ESTERASE UR QL STRIP.AUTO: NEGATIVE
LIPASE SERPL-CCNC: 32 U/L (ref 11–82)
LYMPHOCYTES # BLD AUTO: 2.46 K/UL (ref 1–4.8)
LYMPHOCYTES NFR BLD: 24.2 % (ref 22–41)
MCH RBC QN AUTO: 30.7 PG (ref 27–33)
MCHC RBC AUTO-ENTMCNC: 33.8 G/DL (ref 32.2–35.5)
MCV RBC AUTO: 90.7 FL (ref 81.4–97.8)
MICRO URNS: ABNORMAL
MONOCYTES # BLD AUTO: 0.64 K/UL (ref 0–0.85)
MONOCYTES NFR BLD AUTO: 6.3 % (ref 0–13.4)
NEUTROPHILS # BLD AUTO: 6.91 K/UL (ref 1.82–7.42)
NEUTROPHILS NFR BLD: 67.8 % (ref 44–72)
NITRITE UR QL STRIP.AUTO: NEGATIVE
NRBC # BLD AUTO: 0 K/UL
NRBC BLD-RTO: 0 /100 WBC (ref 0–0.2)
PH UR STRIP.AUTO: 7 [PH] (ref 5–8)
PLATELET # BLD AUTO: 424 K/UL (ref 164–446)
PMV BLD AUTO: 9.1 FL (ref 9–12.9)
POTASSIUM SERPL-SCNC: 4 MMOL/L (ref 3.6–5.5)
PROT SERPL-MCNC: 7.6 G/DL (ref 6–8.2)
PROT UR QL STRIP: NEGATIVE MG/DL
RBC # BLD AUTO: 4.43 M/UL (ref 4.2–5.4)
RBC # URNS HPF: ABNORMAL /HPF
RBC UR QL AUTO: ABNORMAL
SIGNIFICANT IND 70042: NORMAL
SITE SITE: NORMAL
SODIUM SERPL-SCNC: 138 MMOL/L (ref 135–145)
SOURCE SOURCE: NORMAL
SP GR UR STRIP.AUTO: 1.02
UROBILINOGEN UR STRIP.AUTO-MCNC: 1 EU/DL
WBC # BLD AUTO: 10.2 K/UL (ref 4.8–10.8)
WBC #/AREA URNS HPF: ABNORMAL /HPF

## 2025-05-07 PROCEDURE — 83690 ASSAY OF LIPASE: CPT

## 2025-05-07 PROCEDURE — 84703 CHORIONIC GONADOTROPIN ASSAY: CPT

## 2025-05-07 PROCEDURE — 700102 HCHG RX REV CODE 250 W/ 637 OVERRIDE(OP): Performed by: STUDENT IN AN ORGANIZED HEALTH CARE EDUCATION/TRAINING PROGRAM

## 2025-05-07 PROCEDURE — 36415 COLL VENOUS BLD VENIPUNCTURE: CPT

## 2025-05-07 PROCEDURE — 87389 HIV-1 AG W/HIV-1&-2 AB AG IA: CPT

## 2025-05-07 PROCEDURE — 86780 TREPONEMA PALLIDUM: CPT

## 2025-05-07 PROCEDURE — 81001 URINALYSIS AUTO W/SCOPE: CPT

## 2025-05-07 PROCEDURE — 85025 COMPLETE CBC W/AUTO DIFF WBC: CPT

## 2025-05-07 PROCEDURE — A9270 NON-COVERED ITEM OR SERVICE: HCPCS | Performed by: STUDENT IN AN ORGANIZED HEALTH CARE EDUCATION/TRAINING PROGRAM

## 2025-05-07 PROCEDURE — 87591 N.GONORRHOEAE DNA AMP PROB: CPT | Mod: 91

## 2025-05-07 PROCEDURE — 80053 COMPREHEN METABOLIC PANEL: CPT

## 2025-05-07 PROCEDURE — 96375 TX/PRO/DX INJ NEW DRUG ADDON: CPT

## 2025-05-07 PROCEDURE — 93005 ELECTROCARDIOGRAM TRACING: CPT | Mod: TC | Performed by: EMERGENCY MEDICINE

## 2025-05-07 PROCEDURE — 99284 EMERGENCY DEPT VISIT MOD MDM: CPT

## 2025-05-07 PROCEDURE — 87491 CHLMYD TRACH DNA AMP PROBE: CPT | Mod: 91

## 2025-05-07 PROCEDURE — 700111 HCHG RX REV CODE 636 W/ 250 OVERRIDE (IP): Performed by: STUDENT IN AN ORGANIZED HEALTH CARE EDUCATION/TRAINING PROGRAM

## 2025-05-07 PROCEDURE — 84702 CHORIONIC GONADOTROPIN TEST: CPT

## 2025-05-07 PROCEDURE — 76830 TRANSVAGINAL US NON-OB: CPT

## 2025-05-07 PROCEDURE — 96374 THER/PROPH/DIAG INJ IV PUSH: CPT

## 2025-05-07 RX ORDER — ACETAMINOPHEN 500 MG
1000 TABLET ORAL ONCE
Status: COMPLETED | OUTPATIENT
Start: 2025-05-07 | End: 2025-05-07

## 2025-05-07 RX ORDER — ONDANSETRON 4 MG/1
4 TABLET, ORALLY DISINTEGRATING ORAL ONCE
Status: COMPLETED | OUTPATIENT
Start: 2025-05-07 | End: 2025-05-07

## 2025-05-07 RX ORDER — MORPHINE SULFATE 4 MG/ML
4 INJECTION INTRAVENOUS ONCE
Status: DISCONTINUED | OUTPATIENT
Start: 2025-05-07 | End: 2025-05-07

## 2025-05-07 RX ADMIN — ONDANSETRON 4 MG: 4 TABLET, ORALLY DISINTEGRATING ORAL at 18:33

## 2025-05-07 RX ADMIN — ACETAMINOPHEN 1000 MG: 500 TABLET ORAL at 18:33

## 2025-05-07 NOTE — ED TRIAGE NOTES
"BP (!) 142/87   Pulse 84   Temp 36.4 °C (97.6 °F) (Temporal)   Resp 16   Ht 1.6 m (5' 3\")   Wt 62.5 kg (137 lb 12.6 oz)   LMP 05/01/2025   SpO2 100%   BMI 24.41 kg/m²   Chief Complaint   Patient presents with    Abdominal Pain     Sudden onset of LLQ pain today   Hx of ovarian cyst    does see GYN for this     pain causing Nausea  having vaginal dischg     Nausea     Comes in by herself    "

## 2025-05-08 ENCOUNTER — TELEPHONE (OUTPATIENT)
Dept: OBGYN | Facility: CLINIC | Age: 40
End: 2025-05-08
Payer: COMMERCIAL

## 2025-05-08 LAB
HIV 1+2 AB+HIV1 P24 AG SERPL QL IA: NORMAL
T PALLIDUM AB SER QL IA: NORMAL

## 2025-05-08 NOTE — PROGRESS NOTES
Pt presents as NEW GYN for ER follow-up.  Seen 5/7 for Lt sided pelvic pain and vag bleeding.HCG =129  Pelvic USG =small amount of FF.  Hx of ovarian cystectomy and possible chronic endometritis on path report  Last Depo-provera injection was 10/15/2025  Used ortho-evra patches x 3 weeks, 1.5 months ago, had topical allergic reaction.  Ph#[phone]  Pharm# [facility]

## 2025-05-08 NOTE — TELEPHONE ENCOUNTER
Pt seen in ER on 5/7/2025 with  lt lower abd pain. . US - fibroid in uterus, free fluid with debris in pelvis, no evidence of ectopic. ?pt may be established at another clinic.    5/8/2025 1106  Called pt x2, no answer, subscriber not available    Pt seen in clinic on 5/9/2025 with Dr Pascual.

## 2025-05-08 NOTE — ED PROVIDER NOTES
ED Provider Note    CHIEF COMPLAINT  Chief Complaint   Patient presents with    Abdominal Pain     Sudden onset of LLQ pain today   Hx of ovarian cyst    does see GYN for this     pain causing Nausea  having vaginal dischg     Nausea       EXTERNAL RECORDS REVIEWED  Outpatient Notes op note on 2/1/2024 for excision of endometrial polyp and endometrial curettage for an underlying diagnosis of abnormal uterine bleeding, dysmenorrhea, left ovarian cyst    HPI/ROS  LIMITATION TO HISTORY   Select: : None  OUTSIDE HISTORIAN(S):    Madelyn Fernandez is a 40 y.o. female who presents with acute onset left pubic pain starting today.  Patient reports that she was not having pain since January  after her surgery.  Patient denies fever chills body aches or sweats.  Patient does endorse nausea.  Patient reports that she had a menstrual cycle earlier this month and then it stopped and then restarted again.  Patient denies dysuria.    PAST MEDICAL HISTORY   has a past medical history of ADHD, Asthma, Bronchitis, Herpes, history of interstitial Cystitis (02/11/2019), History of substance abuse ([facility]) (08/28/2023), uterine surgery affecting current pregnancy (01/17/2022), Infectious disease (2007), Other specified symptom associated with female genital organs, Pain, PONV (postoperative nausea and vomiting), Psychiatric problem, Ruptured ovarian cyst (04/23/2014), and Sprain of lateral ligament of ankle joint (03/01/2023).    SURGICAL HISTORY   has a past surgical history that includes mandible fracture orif (02/07/2011); pelviscopy (04/23/2014); other abdominal surgery; other ([id number]); lap,diagnostic abdomen (N/A, 2/1/2024); cystourethroscopy (N/A, 2/1/2024); hysteroscopy with myosure (N/A, 2/1/2024); and ovarian cystectomy (Left, 2/1/2024).    FAMILY HISTORY  Family History   Problem Relation Age of Onset    Thyroid Mother         graves    Psychiatric Illness Mother     Alcohol abuse Mother     Heart Disease Father          CHF, valve problem    Dementia Father     Alcohol/Drug Father     Lung Disease Father         COPD    Hypertension Father     Drug abuse Father     Alcohol abuse Father     No Known Problems Sister     No Known Problems Brother     Cancer Paternal Grandmother        SOCIAL HISTORY  Social History     Tobacco Use    Smoking status: Former     Current packs/day: 0.00     Average packs/day: 0.3 packs/day for 5.0 years (1.3 ttl pk-yrs)     Types: Electronic Cigarettes, Cigarettes     Start date:      Quit date: 2013     Years since quittin.3    Smokeless tobacco: Never    Tobacco comments:     Stopped four years ago and now vapes   Vaping Use    Vaping status: Every Day    Start date: 2018    Substances: Nicotine    Devices: Disposable   Substance and Sexual Activity    Alcohol use: Yes     Alcohol/week: 2.4 oz     Types: 4 Glasses of wine per week    Drug use: No     Types: Intravenous, Inhaled     Comment: speed and marijuana, quit . Pt has since she found out was pregnant    Sexual activity: Yes     Partners: Male       CURRENT MEDICATIONS  Home Medications       Reviewed by Sera Mcintyre R.N. (Registered Nurse) on 25 at 1649  Med List Status: Partial     Medication Last Dose Status   albuterol 108 (90 Base) MCG/ACT Aero Soln inhalation aerosol  Active   amphetamine-dextroamphetamine (ADDERALL, 15MG,) 15 MG tablet  Active   amphetamine-dextroamphetamine (ADDERALL, 15MG,) 15 MG tablet  Active   amphetamine-dextroamphetamine (ADDERALL, 15MG,) 15 MG tablet  Active   amphetamine-dextroamphetamine (ADDERALL, 15MG,) 15 MG tablet  Active   amphetamine-dextroamphetamine (ADDERALL, 20MG,) 20 MG Tab  Active   amphetamine-dextroamphetamine (ADDERALL, 5MG,) 5 MG Tab  Active   ARIPiprazole (ABILIFY) 10 MG Tab  Active   ARIPiprazole (ABILIFY) 2 MG tablet  Active   cephALEXin (KEFLEX) 500 MG Cap  Active   hydrOXYzine HCl (ATARAX) 25 MG Tab  Active   hydrOXYzine HCl (ATARAX) 25 MG Tab  Active   influenza  "vaccine (FLUZONE) 0.5 ML Suspension Prefilled Syringe injection  Active   levonorgestrel (PLAN B ONE-STEP) 1.5 MG Tab  Active   Lidocaine (ZTLIDO) 1.8 % Patch  Active   valacyclovir (VALTREX) 1 GM Tab  Active                  Audit from Redirected Encounters    **Home medications have not yet been reviewed for this encounter**         ALLERGIES  Allergies   Allergen Reactions    Avocado Hives and Swelling       PHYSICAL EXAM  VITAL SIGNS: BP (!) 142/87   Pulse 84   Temp 36.4 °C (97.6 °F) (Temporal)   Resp 16   Ht 1.6 m (5' 3\")   Wt 62.5 kg (137 lb 12.6 oz)   LMP 05/01/2025   SpO2 100%   BMI 24.41 kg/m²    Vitals and nursing note reviewed.   Constitutional:       Comments: Patient is lying in bed supine, pleasant, conversant, speaking in complete sentences   HENT:      Head: Normocephalic and atraumatic.   Eyes:      Extraocular Movements: Extraocular movements intact.      Conjunctiva/sclera: Conjunctivae normal.      Pupils: Pupils are equal, round, and reactive to light.   Cardiovascular:      Pulses: Normal pulses.      Comments: HR 84  Pulmonary:      Effort: Pulmonary effort is normal. No respiratory distress.   Abdominal:      Comments: Abdomen is soft, left lower quadrant left superior tenderness to palpation.  No left CVA tenderness to palpation.  Musculoskeletal:         General: No swelling. Normal range of motion.      Cervical back: Normal range of motion. No rigidity.   Skin:     General: Skin is warm and dry.      Capillary Refill: Capillary refill takes less than 2 seconds.   Neurological:      Mental Status: Alert.       RADIOLOGY/PROCEDURES   I have independently interpreted the diagnostic imaging associated with this visit and am waiting the final reading from the radiologist.   My preliminary interpretation is as follows:     Radiologist interpretation:  CT-ABDOMEN-PELVIS WITH    (Results Pending)   US-PELVIC TRANSVAGINAL ONLY    (Results Pending)       COURSE & MEDICAL DECISION " MAKING    ASSESSMENT, COURSE AND PLAN  Care Narrative: CBC to evaluate for acute anemia and leukocytosis.  CMP to evaluate for acute electrolyte abnormality, acute kidney injury, acute liver failure or dysfunction.  Urinalysis evaluate for UTI.  hCG to evaluate for pregnancy.  CT abdomen pelvis to evaluate for obstruction, perforation with diverticulitis, free fluid, ovarian cyst.  Ultrasound to evaluate for ovarian torsion, ovarian cyst or other acute pelvic process.  Tylenol and Zofran for symptom control.  Care of patient handed off to Dr. Browning.    This dictation has been created using voice recognition software. I am continuously working with the software to minimize the number of voice recognition errors and I have made every attempt to manually correct the errors within my dictation. However errors  related to this voice recognition software may still exist and should be interpreted within the appropriate context.     Electronically signed by: Wei Acosta M.D., 5/7/2025 6:59 PM      FINAL DIAGNOSIS  1. Left lower quadrant abdominal pain    2. Nausea         Electronically signed by: Wei Acosta M.D., 5/7/2025 6:21 PM

## 2025-05-08 NOTE — ED NOTES
Rounded on pt. POC explained to pt, denies needs or complaints at this time. Call light in reach.   Pt ambulated to restroom with steady gait

## 2025-05-08 NOTE — DISCHARGE INSTRUCTIONS
You will need to follow-up with [facility] in 2 days for reevaluation.  Is possible you have a threatened /miscarriage, early pregnancy and is to early to tell.  It is also possibility of ectopic pregnancy or pregnancy outside of the uterus.  For this reason it is vital that you follow-up with gynecology if you are unable complaint with gynecology please call [facility] in 2 days for reevaluation, repeat blood work and ultrasound.  Return to the emergency department at [facility] if you have profound vaginal bleeding, abdominal pain.

## 2025-05-08 NOTE — ED PROVIDER NOTES
ED Provider Note    CHIEF COMPLAINT  Chief Complaint   Patient presents with    Abdominal Pain     Sudden onset of LLQ pain today   Hx of ovarian cyst    does see GYN for this     pain causing Nausea  having vaginal dischg     Nausea         HPI/ROS    Madelyn Rosanna Fernandez is a 40 y.o. female who presents no signs of left lower quadrant pain today.  She does have a history of ovarian cyst and surgical intervention.  Pain is sharp in nature.  Has hematochezia, melena, hematemesis, fever, dysuria.    PAST MEDICAL HISTORY   has a past medical history of ADHD, Asthma, Bronchitis, Herpes, history of interstitial Cystitis (02/11/2019), History of substance abuse ([facility]) (08/28/2023), uterine surgery affecting current pregnancy (01/17/2022), Infectious disease (2007), Other specified symptom associated with female genital organs, Pain, PONV (postoperative nausea and vomiting), Psychiatric problem, Ruptured ovarian cyst (04/23/2014), and Sprain of lateral ligament of ankle joint (03/01/2023).    SURGICAL HISTORY   has a past surgical history that includes mandible fracture orif (02/07/2011); pelviscopy (04/23/2014); other abdominal surgery; other ([id number]); lap,diagnostic abdomen (N/A, 2/1/2024); cystourethroscopy (N/A, 2/1/2024); hysteroscopy with myosure (N/A, 2/1/2024); and ovarian cystectomy (Left, 2/1/2024).    FAMILY HISTORY  Family History   Problem Relation Age of Onset    Thyroid Mother         graves    Psychiatric Illness Mother     Alcohol abuse Mother     Heart Disease Father         CHF, valve problem    Dementia Father     Alcohol/Drug Father     Lung Disease Father         COPD    Hypertension Father     Drug abuse Father     Alcohol abuse Father     No Known Problems Sister     No Known Problems Brother     Cancer Paternal Grandmother        SOCIAL HISTORY  Social History     Tobacco Use    Smoking status: Former     Current packs/day: 0.00     Average packs/day: 0.3 packs/day for 5.0 years (1.3 ttl  "pk-yrs)     Types: Electronic Cigarettes, Cigarettes     Start date:      Quit date: 2013     Years since quittin.3    Smokeless tobacco: Never    Tobacco comments:     Stopped four years ago and now vapes   Vaping Use    Vaping status: Every Day    Start date: 2018    Substances: Nicotine    Devices: Disposable   Substance and Sexual Activity    Alcohol use: Yes     Alcohol/week: 2.4 oz     Types: 4 Glasses of wine per week    Drug use: No     Types: Intravenous, Inhaled     Comment: speed and marijuana, quit . Pt has since she found out was pregnant    Sexual activity: Yes     Partners: Male       CURRENT MEDICATIONS  Home Medications       Reviewed by Sera Mcintyre R.N. (Registered Nurse) on 25 at 1649  Med List Status: Partial     Medication Last Dose Status   albuterol 108 (90 Base) MCG/ACT Aero Soln inhalation aerosol  Active   amphetamine-dextroamphetamine (ADDERALL, 15MG,) 15 MG tablet  Active   amphetamine-dextroamphetamine (ADDERALL, 15MG,) 15 MG tablet  Active   amphetamine-dextroamphetamine (ADDERALL, 15MG,) 15 MG tablet  Active   amphetamine-dextroamphetamine (ADDERALL, 15MG,) 15 MG tablet  Active   amphetamine-dextroamphetamine (ADDERALL, 20MG,) 20 MG Tab  Active   amphetamine-dextroamphetamine (ADDERALL, 5MG,) 5 MG Tab  Active   ARIPiprazole (ABILIFY) 10 MG Tab  Active   ARIPiprazole (ABILIFY) 2 MG tablet  Active   cephALEXin (KEFLEX) 500 MG Cap  Active   hydrOXYzine HCl (ATARAX) 25 MG Tab  Active   hydrOXYzine HCl (ATARAX) 25 MG Tab  Active   influenza vaccine (FLUZONE) 0.5 ML Suspension Prefilled Syringe injection  Active   levonorgestrel (PLAN B ONE-STEP) 1.5 MG Tab  Active   Lidocaine (ZTLIDO) 1.8 % Patch  Active   valacyclovir (VALTREX) 1 GM Tab  Active                    ALLERGIES  Allergies   Allergen Reactions    Avocado Hives and Swelling       PHYSICAL EXAM  VITAL SIGNS: /84   Pulse 79   Temp 36.4 °C (97.6 °F) (Temporal)   Resp 16   Ht 1.6 m (5' 3\")   " Wt 62.5 kg (137 lb 12.6 oz)   LMP 2025   SpO2 98%   BMI 24.41 kg/m²      Nursing notes and vitals reviewed.  Constitutional: Well developed, Well nourished, No acute distress, Non-toxic appearance.   Eyes: PERRLA, EOMI, Conjunctiva normal, No discharge.   Cardiovascular: Normal heart rate, Normal rhythm, No murmurs, No rubs, No gallops.   Thorax & Lungs: No respiratory distress, No rales, No rhonchi, No wheezing, No chest tenderness.   Abdomen: Bowel sounds normal, Soft, No tenderness, No guarding, No rebound, No masses, No pulsatile masses.   Skin: Warm, Dry, No erythema, No rash.   Pelvic: In the presence of female technician, external examination no active bleeding, speculum examination slight dark blood from a closed cervical os, no POC, by mall examination no cervical motion tenderness, no adnexal tenderness bilaterally      EKG/LABS  Normal sinus rhythm on monitor  I have independently interpreted this EKG  Results for orders placed or performed during the hospital encounter of 25   EKG    Collection Time: 25  5:10 PM   Result Value Ref Range    Report       [facility] Emergency Dept.    Test Date:  2025  Pt Name:    NEENA RICHARD             Department: [facility]  MRN:        [id number]                      Room:       -ROOM 1  Gender:     Female                       Technician: [id number]  :        1985                   Requested By:RICARDO GÓMEZ  Order #:    [id number]                    Reading MD:    Measurements  Intervals                                Axis  Rate:       71                           P:          53  NJ:         148                          QRS:        61  QRSD:       96                           T:          59  QT:         385  QTc:        419    Interpretive Statements  Sinus rhythm  Compared to ECG 2024 11:59:20  No significant changes     CBC WITH DIFFERENTIAL    Collection Time: 25  7:08 PM   Result Value Ref  Range    WBC 10.2 4.8 - 10.8 K/uL    RBC 4.43 4.20 - 5.40 M/uL    Hemoglobin 13.6 12.0 - 16.0 g/dL    Hematocrit 40.2 37.0 - 47.0 %    MCV 90.7 81.4 - 97.8 fL    MCH 30.7 27.0 - 33.0 pg    MCHC 33.8 32.2 - 35.5 g/dL    RDW 43.3 35.9 - 50.0 fL    Platelet Count 424 164 - 446 K/uL    MPV 9.1 9.0 - 12.9 fL    Neutrophils-Polys 67.80 44.00 - 72.00 %    Lymphocytes 24.20 22.00 - 41.00 %    Monocytes 6.30 0.00 - 13.40 %    Eosinophils 1.00 0.00 - 6.90 %    Basophils 0.40 0.00 - 1.80 %    Immature Granulocytes 0.30 0.00 - 0.90 %    Nucleated RBC 0.00 0.00 - 0.20 /100 WBC    Neutrophils (Absolute) 6.91 1.82 - 7.42 K/uL    Lymphs (Absolute) 2.46 1.00 - 4.80 K/uL    Monos (Absolute) 0.64 0.00 - 0.85 K/uL    Eos (Absolute) 0.10 0.00 - 0.51 K/uL    Baso (Absolute) 0.04 0.00 - 0.12 K/uL    Immature Granulocytes (abs) 0.03 0.00 - 0.11 K/uL    NRBC (Absolute) 0.00 K/uL   Comp Metabolic Panel    Collection Time: 05/07/25  7:08 PM   Result Value Ref Range    Sodium 138 135 - 145 mmol/L    Potassium 4.0 3.6 - 5.5 mmol/L    Chloride 101 96 - 112 mmol/L    Co2 23 20 - 33 mmol/L    Anion Gap 14.0 7.0 - 16.0    Glucose 98 65 - 99 mg/dL    Bun 12 8 - 22 mg/dL    Creatinine 0.64 0.50 - 1.40 mg/dL    Calcium 9.4 8.5 - 10.5 mg/dL    Correct Calcium 8.8 8.5 - 10.5 mg/dL    AST(SGOT) 18 12 - 45 U/L    ALT(SGPT) 21 2 - 50 U/L    Alkaline Phosphatase 109 (H) 30 - 99 U/L    Total Bilirubin <0.2 0.1 - 1.5 mg/dL    Albumin 4.7 3.2 - 4.9 g/dL    Total Protein 7.6 6.0 - 8.2 g/dL    Globulin 2.9 1.9 - 3.5 g/dL    A-G Ratio 1.6 g/dL   HCG QUAL SERUM    Collection Time: 05/07/25  7:08 PM   Result Value Ref Range    Beta-Hcg Qualitative Serum Positive (A) Negative   LIPASE    Collection Time: 05/07/25  7:08 PM   Result Value Ref Range    Lipase 32 11 - 82 U/L   ESTIMATED GFR    Collection Time: 05/07/25  7:08 PM   Result Value Ref Range    GFR (CKD-EPI) 116 >60 mL/min/1.73 m 2   HCG QUANTITATIVE    Collection Time: 05/07/25  7:08 PM   Result Value Ref  Range    Bhcg 129.0 (H) 0.0 - 10.0 mIU/mL   URINALYSIS    Collection Time: 05/07/25  7:52 PM    Specimen: Urine   Result Value Ref Range    Color Yellow     Character Turbid (A)     Specific Gravity 1.016 <1.035    Ph 7.0 5.0 - 8.0    Glucose Negative Negative mg/dL    Ketones Negative Negative mg/dL    Protein Negative Negative mg/dL    Bilirubin Negative Negative    Urobilinogen, Urine 1.0 <=1.0 EU/dL    Nitrite Negative Negative    Leukocyte Esterase Negative Negative    Occult Blood Trace (A) Negative    Micro Urine Req Microscopic    URINE MICROSCOPIC (W/UA)    Collection Time: 05/07/25  7:52 PM   Result Value Ref Range    WBC 0-2 /hpf    RBC 3-5 (A) /hpf    Bacteria None Seen None /hpf    Epithelial Cells 0-2 0 - 5 /hpf    Urine Casts 0-2 0 - 2 /lpf   WET PREP    Collection Time: 05/07/25  8:04 PM    Specimen: Vaginal; Genital   Result Value Ref Range    Significant Indicator NEG     Source GEN     Site VAGINAL     Wet Prep For Parasites       No yeast.  No motile Trichomonas seen.  No clue cells seen.  Rare WBCs seen.     Chlamydia/GC, PCR (Genital/Anal swab)    Collection Time: 05/07/25  8:04 PM    Specimen: Genital   Result Value Ref Range    Source Vaginal        RADIOLOGY/PROCEDURES   I have independently interpreted the diagnostic imaging associated with this visit and am waiting the final reading from the radiologist.   My preliminary interpretation is as follows: No intrauterine pregnancy    Radiologist interpretation:  US-PELVIC COMPLETE (TRANSABDOMINAL/TRANSVAGINAL) (COMBO)   Final Result      1.  Small amount of free fluid is identified in the pelvis containing debris.      2.  Solid mass in right uterine wall is identified consistent with leiomyoma.          COURSE & MEDICAL DECISION MAKING    ASSESSMENT, COURSE AND PLAN  Care Narrative: This a pleasant 40-year-old female who presents with Juan C pain.  She had a positive pregnancy test with a quantitative of 129.  Ultrasound reveals no evidence  of intrauterine pregnancy, slight debris within the pelvis but no evidence of ectopic pregnancy, no significant fluid collection.  It is possible she has a spontaneous , threatened , early pregnancy.  She has no excessive bleeding here in the emergency department.  The patient clinically does not have evidence appendicitis, diverticulitis or small bowel obstruction.  I have referred her to follow-up with [facility] in 2 days and she can make that appointment follow-up [facility] for further testing.  She is to respect pelvic rest and return if she has regular pain.        DISPOSITION AND DISCUSSIONS  I have discussed management of the patient with the following physicians and OWEN's: Signed out to me by Dr. Acosta    Decision tools and prescription drugs considered including, but not limited to: CT scan of the patient's pregnancy of her CT scan is contraindicated.  Do not suspect appendicitis, diverticulitis versus intra-abdominal infection.    FINAL DIAGNOSIS  1. Left lower quadrant abdominal pain    2. Nausea    3. , threatened        DISPOSITION:  Patient will be discharged home in stable condition.    FOLLOW UP:  [facility]  [address]  [facility] [address]  [phone]    If symptoms worsen    [facility]  [address]  [facility] [address]  [phone]  Schedule an appointment as soon as possible for a visit in 2 days      Electronically signed by: Gwyn Gracia D.O., 2025 6:48 PM

## 2025-05-09 ENCOUNTER — OFFICE VISIT (OUTPATIENT)
Dept: OBGYN | Facility: CLINIC | Age: 40
End: 2025-05-09
Payer: COMMERCIAL

## 2025-05-09 ENCOUNTER — HOSPITAL ENCOUNTER (OUTPATIENT)
Dept: LAB | Facility: MEDICAL CENTER | Age: 40
End: 2025-05-09
Attending: STUDENT IN AN ORGANIZED HEALTH CARE EDUCATION/TRAINING PROGRAM
Payer: COMMERCIAL

## 2025-05-09 VITALS
WEIGHT: 138 LBS | BODY MASS INDEX: 23.56 KG/M2 | DIASTOLIC BLOOD PRESSURE: 80 MMHG | HEIGHT: 64 IN | SYSTOLIC BLOOD PRESSURE: 125 MMHG

## 2025-05-09 DIAGNOSIS — O36.80X0 PREGNANCY WITH UNCERTAIN FETAL VIABILITY, SINGLE OR UNSPECIFIED FETUS: Primary | ICD-10-CM

## 2025-05-09 DIAGNOSIS — O36.80X0 PREGNANCY WITH UNCERTAIN FETAL VIABILITY, SINGLE OR UNSPECIFIED FETUS: ICD-10-CM

## 2025-05-09 DIAGNOSIS — Z87.59 HISTORY OF ECTOPIC PREGNANCY: ICD-10-CM

## 2025-05-09 LAB
C TRACH DNA SPEC QL NAA+PROBE: NEGATIVE
N GONORRHOEA DNA SPEC QL NAA+PROBE: NEGATIVE
SPECIMEN SOURCE: NORMAL

## 2025-05-09 PROCEDURE — 99203 OFFICE O/P NEW LOW 30 MIN: CPT | Performed by: STUDENT IN AN ORGANIZED HEALTH CARE EDUCATION/TRAINING PROGRAM

## 2025-05-09 PROCEDURE — 3074F SYST BP LT 130 MM HG: CPT | Performed by: STUDENT IN AN ORGANIZED HEALTH CARE EDUCATION/TRAINING PROGRAM

## 2025-05-09 PROCEDURE — 84702 CHORIONIC GONADOTROPIN TEST: CPT

## 2025-05-09 PROCEDURE — 3079F DIAST BP 80-89 MM HG: CPT | Performed by: STUDENT IN AN ORGANIZED HEALTH CARE EDUCATION/TRAINING PROGRAM

## 2025-05-09 PROCEDURE — 36415 COLL VENOUS BLD VENIPUNCTURE: CPT

## 2025-05-09 ASSESSMENT — FIBROSIS 4 INDEX: FIB4 SCORE: 0.35

## 2025-05-09 NOTE — PROGRESS NOTES
Verbal consent was acquired by the patient to use [employer] ambient listening note generation during this visit Yes      Subjective   Madelyn Fernandez is a 40 y.o. female who presents for early pregnancy follow up. She went to the ED on 5/7 for abdominal pain that felt like her previous ruptured ovarian cysts. She found out she was pregnant on presentation to the ED.   History of Present Illness  The patient came in today because she has a positive pregnancy test and is in the early stages of pregnancy.    Her last period started on May 1, 2025, and the one before that was on April 1, 2025. Her periods have been regular since about a month and a half after her last Depo shot. She tried using contraceptive patches in February 2024 but had to stop after 3 weeks because she broke out in hives, couldn't stop itching, and her skin was peeling. She had a period after stopping the patches. Her periods usually last between 4 to 5 days, but she's not sure about the exact length of her cycle because she's had irregular periods most of her life.     Her cycles became more regular after she had her child. She is sexually active with one partner and had sex on April 25, 2025. She hasn't had any bleeding since she left the hospital but sometimes sees a little brownish discharge when she goes to the bathroom. She doesn't need to use any pads and isn't in pain. She wants to keep this pregnancy. She has a history of ectopic pregnancy that was treated with methotrexate injections for 3 days, followed by surgery because the fetal heartbeat persisted. She also had a miscarriage at 13 weeks in her first pregnancy. She often has ruptured cysts, with the last one being removed on February 5, 2025. She still has both her fallopian tubes and ovaries.    GYNECOLOGICAL HISTORY:  - Last menstrual period: May 1, 2025  - Duration: 4-5 days  - Frequency and flow: Regular since March 2025  - H/o surgical evaluation and treatment of  "ruptured ovarian cyst X 2.   - Cornual resection of ectopic pregnancy via open abdominal incision    CONTRACEPTION:  - Type used: Depo-Provera, contraceptive patches (discontinued due to allergic reaction)  - History: Discontinued patches in 2025 due to allergic reaction  - Reproductive plans: Desires to maintain current pregnancy    OBSTETRICAL HISTORY:  -  4, Para 1    PAST SURGICAL HISTORY:  - Ruptured cyst removal on 2024  - Ectopic pregnancy surgery with  incision  - ovarian cyst rupture   - c/s at 36 weeks  for history of cornual resection during ectopic surgery    Review of Systems    Objective   /80 (BP Location: Right arm, Patient Position: Sitting)   Ht 5' 4\"   Wt 138 lb   Physical Exam  Gen: Patient resting comfortably in no acute distress, nourished well-developed  Cardio: Appears well-perfused  Pulm: Speaking in clear sentences without increased work of breathing  Skin: No obvious rashes  Abdominal exam: deferred  Pelvic exam deferred: follow-up    Results  Labs   - Pregnancy hormone level (hCG): 129    Assessment & Plan  1. Pregnancy with uncertain fetal viability, single or unspecified fetus  HCG QUANTITATIVE      2. History of ectopic pregnancy        41yo   Early pregnancy: Elevated risk for ectopic pregnancy. Corpus luteal cyst noted. Hormone level 129 on 2024.    - Monitor pregnancy hormone levels every 2 days to ensure minimum rate of rise.  - Perform ultrasound when hormone level reaches approximately 3000 to 3500.  - Advise immediate ER visit if experiencing consistent or persistent pain.  - Consider methotrexate treatment if hormone levels do not rise as expected.  - Monitor symptoms closely.    Follow-up  - Follow up in 3 to 4 weeks.  -all questions answered and pt agreeable to plan of care    Erin Pascual DO, FACOG  [facility]       Please note that this dictation was created using voice recognition software. I " have made every reasonable attempt to correct obvious errors, but I expect that there are errors of grammar and possibly content that I did not discover before finalizing the note.

## 2025-05-10 LAB
B-HCG SERPL-ACNC: 44.9 MIU/ML (ref 0–5)
C TRACH DNA GENITAL QL NAA+PROBE: NORMAL
N GONORRHOEA DNA GENITAL QL NAA+PROBE: NORMAL
SPECIMEN SOURCE: NORMAL

## 2025-05-13 ENCOUNTER — APPOINTMENT (OUTPATIENT)
Dept: RADIOLOGY | Facility: MEDICAL CENTER | Age: 40
End: 2025-05-13
Attending: STUDENT IN AN ORGANIZED HEALTH CARE EDUCATION/TRAINING PROGRAM
Payer: COMMERCIAL

## 2025-05-13 ENCOUNTER — HOSPITAL ENCOUNTER (EMERGENCY)
Facility: MEDICAL CENTER | Age: 40
End: 2025-05-14
Attending: STUDENT IN AN ORGANIZED HEALTH CARE EDUCATION/TRAINING PROGRAM
Payer: COMMERCIAL

## 2025-05-13 DIAGNOSIS — O36.80X0 PREGNANCY OF UNKNOWN ANATOMIC LOCATION: ICD-10-CM

## 2025-05-13 DIAGNOSIS — R79.89 ELEVATED SERUM HCG: Primary | ICD-10-CM

## 2025-05-13 DIAGNOSIS — R10.30 LOWER ABDOMINAL PAIN: ICD-10-CM

## 2025-05-13 DIAGNOSIS — R18.8 FREE FLUID IN PELVIS: ICD-10-CM

## 2025-05-13 LAB
ALBUMIN SERPL BCP-MCNC: 4.7 G/DL (ref 3.2–4.9)
ALBUMIN/GLOB SERPL: 1.4 G/DL
ALP SERPL-CCNC: 101 U/L (ref 30–99)
ALT SERPL-CCNC: 21 U/L (ref 2–50)
ANION GAP SERPL CALC-SCNC: 11 MMOL/L (ref 7–16)
APPEARANCE UR: CLEAR
AST SERPL-CCNC: 20 U/L (ref 12–45)
B-HCG SERPL-ACNC: 24.4 MIU/ML (ref 0–5)
BACTERIA #/AREA URNS HPF: ABNORMAL /HPF
BASOPHILS # BLD AUTO: 0.3 % (ref 0–1.8)
BASOPHILS # BLD: 0.03 K/UL (ref 0–0.12)
BILIRUB SERPL-MCNC: 0.2 MG/DL (ref 0.1–1.5)
BILIRUB UR QL STRIP.AUTO: NEGATIVE
BUN SERPL-MCNC: 10 MG/DL (ref 8–22)
CALCIUM ALBUM COR SERPL-MCNC: 8.7 MG/DL (ref 8.5–10.5)
CALCIUM SERPL-MCNC: 9.3 MG/DL (ref 8.5–10.5)
CASTS URNS QL MICRO: ABNORMAL /LPF (ref 0–2)
CHLORIDE SERPL-SCNC: 103 MMOL/L (ref 96–112)
CO2 SERPL-SCNC: 23 MMOL/L (ref 20–33)
COLOR UR: YELLOW
CREAT SERPL-MCNC: 0.64 MG/DL (ref 0.5–1.4)
EOSINOPHIL # BLD AUTO: 0.13 K/UL (ref 0–0.51)
EOSINOPHIL NFR BLD: 1.2 % (ref 0–6.9)
EPITHELIAL CELLS 1715: ABNORMAL /HPF (ref 0–5)
ERYTHROCYTE [DISTWIDTH] IN BLOOD BY AUTOMATED COUNT: 41.3 FL (ref 35.9–50)
GFR SERPLBLD CREATININE-BSD FMLA CKD-EPI: 116 ML/MIN/1.73 M 2
GLOBULIN SER CALC-MCNC: 3.3 G/DL (ref 1.9–3.5)
GLUCOSE SERPL-MCNC: 105 MG/DL (ref 65–99)
GLUCOSE UR STRIP.AUTO-MCNC: NEGATIVE MG/DL
HCG SERPL QL: POSITIVE
HCT VFR BLD AUTO: 41.8 % (ref 37–47)
HGB BLD-MCNC: 14.1 G/DL (ref 12–16)
IMM GRANULOCYTES # BLD AUTO: 0.02 K/UL (ref 0–0.11)
IMM GRANULOCYTES NFR BLD AUTO: 0.2 % (ref 0–0.9)
KETONES UR STRIP.AUTO-MCNC: NEGATIVE MG/DL
LEUKOCYTE ESTERASE UR QL STRIP.AUTO: NEGATIVE
LIPASE SERPL-CCNC: 38 U/L (ref 11–82)
LYMPHOCYTES # BLD AUTO: 3.77 K/UL (ref 1–4.8)
LYMPHOCYTES NFR BLD: 35 % (ref 22–41)
MCH RBC QN AUTO: 29.5 PG (ref 27–33)
MCHC RBC AUTO-ENTMCNC: 33.7 G/DL (ref 32.2–35.5)
MCV RBC AUTO: 87.4 FL (ref 81.4–97.8)
MICRO URNS: ABNORMAL
MONOCYTES # BLD AUTO: 0.7 K/UL (ref 0–0.85)
MONOCYTES NFR BLD AUTO: 6.5 % (ref 0–13.4)
NEUTROPHILS # BLD AUTO: 6.12 K/UL (ref 1.82–7.42)
NEUTROPHILS NFR BLD: 56.8 % (ref 44–72)
NITRITE UR QL STRIP.AUTO: NEGATIVE
NRBC # BLD AUTO: 0 K/UL
NRBC BLD-RTO: 0 /100 WBC (ref 0–0.2)
PH UR STRIP.AUTO: 6 [PH] (ref 5–8)
PLATELET # BLD AUTO: 457 K/UL (ref 164–446)
PMV BLD AUTO: 8.5 FL (ref 9–12.9)
POTASSIUM SERPL-SCNC: 4 MMOL/L (ref 3.6–5.5)
PROT SERPL-MCNC: 8 G/DL (ref 6–8.2)
PROT UR QL STRIP: NEGATIVE MG/DL
RBC # BLD AUTO: 4.78 M/UL (ref 4.2–5.4)
RBC # URNS HPF: ABNORMAL /HPF (ref 0–2)
RBC UR QL AUTO: ABNORMAL
SODIUM SERPL-SCNC: 137 MMOL/L (ref 135–145)
SP GR UR STRIP.AUTO: 1.01
UROBILINOGEN UR STRIP.AUTO-MCNC: 1 EU/DL
WBC # BLD AUTO: 10.8 K/UL (ref 4.8–10.8)
WBC #/AREA URNS HPF: ABNORMAL /HPF

## 2025-05-13 PROCEDURE — 81001 URINALYSIS AUTO W/SCOPE: CPT

## 2025-05-13 PROCEDURE — 80053 COMPREHEN METABOLIC PANEL: CPT

## 2025-05-13 PROCEDURE — 84703 CHORIONIC GONADOTROPIN ASSAY: CPT

## 2025-05-13 PROCEDURE — 85025 COMPLETE CBC W/AUTO DIFF WBC: CPT

## 2025-05-13 PROCEDURE — 99284 EMERGENCY DEPT VISIT MOD MDM: CPT

## 2025-05-13 PROCEDURE — 36415 COLL VENOUS BLD VENIPUNCTURE: CPT

## 2025-05-13 PROCEDURE — 76817 TRANSVAGINAL US OBSTETRIC: CPT

## 2025-05-13 PROCEDURE — 83690 ASSAY OF LIPASE: CPT

## 2025-05-13 PROCEDURE — 84702 CHORIONIC GONADOTROPIN TEST: CPT

## 2025-05-13 ASSESSMENT — FIBROSIS 4 INDEX: FIB4 SCORE: 0.35

## 2025-05-14 ENCOUNTER — TELEPHONE (OUTPATIENT)
Dept: OBGYN | Facility: CLINIC | Age: 40
End: 2025-05-14
Payer: COMMERCIAL

## 2025-05-14 VITALS
BODY MASS INDEX: 24.06 KG/M2 | WEIGHT: 135.8 LBS | RESPIRATION RATE: 14 BRPM | SYSTOLIC BLOOD PRESSURE: 94 MMHG | DIASTOLIC BLOOD PRESSURE: 55 MMHG | HEART RATE: 75 BPM | TEMPERATURE: 97 F | HEIGHT: 63 IN | OXYGEN SATURATION: 96 %

## 2025-05-14 NOTE — TELEPHONE ENCOUNTER
Pt called to inform us that she was recently seen in the ER for lower abdominal pain. She stated that she was being managed by  for a SAB a few  days prior to being seen in the ED this morning. Pt reported that that she left the ED AMA after  being there for several hours without being seen by a physician and having to leave for work .  Pt requested the results of the u/s that was completed during her ER visit. Informed the pt that the results had not yet been signed or reviewed, and therefore we were unable to release any information at this time.  Pt also asked whether she should keep her scheduled appt with  and if she should repeat another HCG quant. Advised the pt to keep her appt and to repeat the beta quant 48-72 hours from the one she completed today in the ER. Expalined to the pt that there is a standing in her chart for her to get the lab completed.  Pt then asked about starting a low dose hormone birth control pill she has at home but has not yet started. Advised the pt not to being the pill until after speaking with Dr. Butler at her upcoming appt, especially since her HCG level is not at zero. At that time, she can also voice any additional questions or concerns.   I reviewed bleeding and ectopic precautions with the pt and instructed her to return to the ER if any of those symptoms occur. Pt agreed to the plan and had no further questions or concerns at this time.

## 2025-05-14 NOTE — ED NOTES
I discussed the proposed treatment of lower abdominal pain with the patient, Madelyn Fernandez, including its potential benefits, risks, and alternatives. The patient was also informed of the possible consequences of refusing the treatment, including death.  Despite this detailed discussion, the patient has expressed their decision to decline the proposed treatment. They were given the opportunity to ask questions. The patient demonstrated understanding of the information provided.  The patient has been encouraged to return or seek medical attention if their condition worsens or if they reconsider their decision.    Bernadine Barakat R.N.

## 2025-05-14 NOTE — ED TRIAGE NOTES
Madelyn Fernandez  40 y.o. female    Chief Complaint   Patient presents with    Pelvic Pain     Pt states seen at [facility] last week, for LLQ pain, found to be having miscarriage, seen after at clinic and levels had dropped even more. But was told to come in if having increased pain in LLQ. States today started having worsening pain today radiating down leg.  States hx of ovarian cyst and feels the same.       Pt ambulated to triage for above complaint. Pt states not really bleeding just having brownish discharge at times. Pt alert and oriented.     Vitals:    05/13/25 2016   BP: (!) 141/100   Pulse: 92   Resp: 16   Temp: 36.1 °C (97 °F)   SpO2: 99%       Triage process explained to patient, apologized for wait time, and returned to lobby.  Pt informed to notify staff of any change in condition.      Yes

## 2025-05-14 NOTE — ED PROVIDER NOTES
"  ER Provider Note    Scribed for Katy Lagunas M.D. by Dave Hickman. 5/13/2025   9:10 PM    Primary Care Provider: Kristen Rivero P.A.-C.    CHIEF COMPLAINT  Chief Complaint   Patient presents with    Pelvic Pain     Pt states seen at [facility] last week, for LLQ pain, found to be having miscarriage, seen after at clinic and levels had dropped even more. But was told to come in if having increased pain in LLQ. States today started having worsening pain today radiating down leg.  States hx of ovarian cyst and feels the same.       EXTERNAL RECORDS REVIEWED  Outpatient Notes 5/9/2025 seen By Dr. Pascual, downtrending hCG, concern for possible ectopic,    HPI/ROS  LIMITATION TO HISTORY   None noted   OUTSIDE HISTORIAN(S):  Family present at bedside.     Madelyn Fernandez is a 40 y.o. female who presents to the ED for evaluation of left sided pelvic pain. Patient was seen at [facility] ER on Thursday of last week for left lower quadrant pain, found to be having miscarriage, at that time. Patient has been HCG trending since her visit for assumed miscarriage. Patient was seen by Dr Pascual (gynecology) on Friday, her HCG levels at that visit \"dropped significantly.\" Patient was instructed by Dr Pascual to come to the ED in case of increased pain. Patient has a history of left sided tubal pregnancy, requiring methotrexate at that time did not work, requiring surgery. Patient has not had a left sided salpingectomy. On February 25 th of last year she had a \"large\" ovarian cyst removed. Patient arrives today because of excess pelvic pain. Patient had normal bowel movement today. Patient describes her pain as \"cramping,\" with pain which radiates to the left leg and back, she also felt hot flashes and clammy hands earlier today. Patient denies any vaginal bleeding, reports brown vaginal discharge.    PAST MEDICAL HISTORY  Past Medical History:   Diagnosis Date    ADHD     Asthma     Bronchitis     Herpes     " history of interstitial Cystitis 02/11/2019    History of substance abuse (HCC) 08/28/2023    Hx of uterine surgery affecting current pregnancy 01/17/2022    Infectious disease 2007    MRSA abcesses    Other specified symptom associated with female genital organs     bacterial infections    Pain     jaw    PONV (postoperative nausea and vomiting)     Psychiatric problem     anxiety    Ruptured ovarian cyst 04/23/2014    Sprain of lateral ligament of ankle joint 03/01/2023       SURGICAL HISTORY  Past Surgical History:   Procedure Laterality Date    [place] LAP,DIAGNOSTIC ABDOMEN N/A 02/01/2024    Procedure: PELVISCOPY;  Surgeon: Dottie Saldaña M.D.;  Location: SURGERY SAME DAY [facility];  Service: Gynecology    W[place] CYSTOURETHROSCOPY N/A 02/01/2024    Procedure: CYSTOSCOPY;  Surgeon: Dottie Saldaña M.D.;  Location: SURGERY SAME DAY [facility];  Service: Gynecology    HYSTEROSCOPY WITH MYOSURE N/A 02/01/2024    Procedure: PELVIC EXAM UNDER ANESTHESIA, PELVISCOPY, LEFT OVARIAN CYSTECTOMY, CYSTOSCOPY, DIAGNOSTIC AND OPERATIVE HYSTEROSCOPY,ENDOMETRIAL CURETTAGE WITH OSCILLATING CUTTING BLADES;  Surgeon: Dottie Saldaña M.D.;  Location: SURGERY SAME DAY [facility];  Service: Gynecology    OVARIAN CYSTECTOMY Left 02/01/2024    Procedure: EXCISION, CYST, OVARY;  Surgeon: Dottie Saldaña M.D.;  Location: SURGERY SAME DAY [facility];  Service: Gynecology    PELVISCOPY  04/23/2014    Performed by Eileen Cleveland M.D. at SURGERY SAME DAY [facility] ORS    MANDIBLE FRACTURE ORIF  02/07/2011    Performed by SABI BURT JR at SURGERY SAME DAY [facility] ORS    OTHER  [id number]    OTHER ABDOMINAL SURGERY         FAMILY HISTORY  Family History   Problem Relation Age of Onset    Thyroid Mother         graves    Psychiatric Illness Mother     Alcohol abuse Mother     Heart Disease Father         CHF, valve problem    Dementia Father     Alcohol/Drug Father     Lung Disease Father         COPD    Hypertension  Father     Drug abuse Father     Alcohol abuse Father     No Known Problems Sister     No Known Problems Brother     Cancer Paternal Grandmother        SOCIAL HISTORY   reports that she quit smoking about 12 years ago. Her smoking use included electronic cigarettes and cigarettes. She started smoking about 17 years ago. She has a 1.3 pack-year smoking history. She has never used smokeless tobacco. She reports that she does not currently use alcohol after a past usage of about 2.4 oz of alcohol per week. She reports that she does not use drugs.    CURRENT MEDICATIONS  Discharge Medication List as of 5/14/2025  4:14 AM        CONTINUE these medications which have NOT CHANGED    Details   !! hydrOXYzine HCl (ATARAX) 25 MG Tab Take 1 tablet by mouth nightly at bedtime., Disp-30 Tablet, R-0, Normal      amphetamine-dextroamphetamine (ADDERALL, 20MG,) 20 MG Tab Take 1 Tablet(s) once a day (in the morning)F90.0 30 days supplyDisp-30 Tablet, R-0, Normal      !! ARIPiprazole (ABILIFY) 2 MG tablet Take 1 tablet by mouth every night at bedtime, Disp-14 Tablet, R-0, Normal      Lidocaine (ZTLIDO) 1.8 % Patch APPLY 1-2 PATCHES BY TOPICAL ROUTE ONCE DAILY (MAY WEAR UP TO 12 HOURS.), Disp-60 Patch, R-2, Normal      !! amphetamine-dextroamphetamine (ADDERALL, 15MG,) 15 MG tablet Take 1 Tablet(s) 1 time a day at noonF90.0    10 days supplyDisp-10 Tablet, R-0, Normal      !! amphetamine-dextroamphetamine (ADDERALL, 15MG,) 15 MG tablet Take 1 Tablet by mouth 2 times a day (morning and noon)f90.0     30 days supplyDisp-60 Tablet, R-0, Normal      influenza vaccine (FLUZONE) 0.5 ML Suspension Prefilled Syringe injection Inject 1 dose intramuscularly, Disp-0.5 mL, R-0, Normal      !! amphetamine-dextroamphetamine (ADDERALL, 15MG,) 15 MG tablet Take one tablet by mouth in the moring and one at noonF90.0     14 days supplyDisp-28 Tablet, R-0, Normal      !! amphetamine-dextroamphetamine (ADDERALL, 15MG,) 15 MG tablet Take  one tablet by  "mouth in the moring  and   one   at noonF90.0    7 days supplyDisp-14 Tablet, R-0, Normal      amphetamine-dextroamphetamine (ADDERALL, 5MG,) 5 MG Tab Take 1 Tablet(s) by mouth once a day (in the evening)F90.0     7 days supplyDisp-7 Tablet, R-0, Normal      valacyclovir (VALTREX) 1 GM Tab Take 1 Tablet by mouth 2 times a day., Disp-20 Tablet, R-0, Normal      !! ARIPiprazole (ABILIFY) 10 MG Tab Take 1 oral tablet once a dayLast office visit:[id number] Tablet, R-2, Normal      cephALEXin (KEFLEX) 500 MG Cap Take 1 Capsule by mouth 2 times a day., Disp-10 Capsule, R-0, Normal      !! hydrOXYzine HCl (ATARAX) 25 MG Tab Take 1 oral tablet 3 times a dayLast office visit:[id number] Tablet, R-2, Normal      levonorgestrel (PLAN B ONE-STEP) 1.5 MG Tab Take 1 tablet by mouth as directed Orally oncecall patient when ready for pickup.  Generic/low cost version please.  Administer as many Plan B tablets as her insurance will allow with 3 refills please.Disp-12 Tablet, R-3, Normal      albuterol 108 (90 Base) MCG/ACT Aero Soln inhalation aerosol Inhale 2 Puffs every 6 hours as needed for Shortness of Breath., Disp-8.5 g, R-0, Normal       !! - Potential duplicate medications found. Please discuss with provider.          ALLERGIES  Allergies   Allergen Reactions    Avocado Hives and Swelling        PHYSICAL EXAM  BP (!) 141/100   Pulse 92   Temp 36.1 °C (97 °F) (Temporal)   Resp 16   Ht 1.6 m (5' 3\")   Wt 61.6 kg (135 lb 12.9 oz)   LMP 05/01/2025   SpO2 99%   BMI 24.06 kg/m²    General: Uncomfortable appearing female, uncomfortable appearing  Head: Normocephalic atraumatic  Eyes: Extraocular motion intact  Neck: Supple, no rigidity  Cardiovascular: Regular rate and rhythm no murmurs rubs or gallops  Respiratory: Clear to auscultation bilaterally, equal chest rise and fall, no increased work of breathing  Abdomen: Soft, Lower abdominal tenderness, left lower quadrant greater than right lower quadrant. No rebound " tenderness  Musculoskeletal: Warm and well perfused, no peripheral edema  Neuro: Alert, no focal deficits  Integumentary: No wounds or rashes     DIAGNOSTIC STUDIES    Labs:   Labs Reviewed   CBC WITH DIFFERENTIAL - Abnormal; Notable for the following components:       Result Value    Platelet Count 457 (*)     MPV 8.5 (*)     All other components within normal limits   COMP METABOLIC PANEL - Abnormal; Notable for the following components:    Glucose 105 (*)     Alkaline Phosphatase 101 (*)     All other components within normal limits   HCG QUAL SERUM - Abnormal; Notable for the following components:    Beta-Hcg Qualitative Serum Positive (*)     All other components within normal limits   URINALYSIS,CULTURE IF INDICATED - Abnormal; Notable for the following components:    Occult Blood Trace (*)     All other components within normal limits   HCG QUANTITATIVE - Abnormal; Notable for the following components:    Bhcg 24.4 (*)     All other components within normal limits   URINE MICROSCOPIC (W/UA) - Abnormal; Notable for the following components:    RBC 3-5 (*)     All other components within normal limits   LIPASE   ESTIMATED GFR     hCG is thankfully downtrending.    Radiology:       Radiologist interpretation:   US-OB 1ST TRIMESTER WITH TRANSVAGINAL (COMBO)   Final Result      1.  No intrauterine gestational sac or fetal pole is evident.   2.  1.48 x 1.59 x 1.32 cm anterior fundal fibroid.   3.  2.66 x 2.76 x 2.39 cm simple cyst right ovary.   4.  Free fluid in the cul-de-sac.   5.  No ultrasound findings indicative of ectopic pregnancy in the adnexa. However, ectopic pregnancy cannot be excluded.           INITIAL ASSESSMENT COURSE AND PLAN  Care Narrative     9:10 PM - Patient was evaluated at bedside. They present for pelvic pain. Pertinent PE findings include: Lower abdominal tenderness, left lower quadrant greater than right lower quadrant. No rebound tenderness. Differential diagnoses include but not  limited to: Ruptured ectopic, incomplete , retained products of conception acute blood loss anemia,     Thankfully, patient is not anemic, hCG is downtrending.  Ultrasound pending, delay in receiving results due to electronic medical record downtime during patient encounter. I was in with another acute patient when I was informed the patient had left AGAINST MEDICAL ADVICE, prior to my ability to discuss with her.      25  9:29 PM    US-OB 1ST TRIMESTER WITH TRANSVAGINAL (COMBO)   Final Result      1.  No intrauterine gestational sac or fetal pole is evident.   2.  1.48 x 1.59 x 1.32 cm anterior fundal fibroid.   3.  2.66 x 2.76 x 2.39 cm simple cyst right ovary.   4.  Free fluid in the cul-de-sac.   5.  No ultrasound findings indicative of ectopic pregnancy in the adnexa. However, ectopic pregnancy cannot be excluded.          I did attempt to call the patient regarding findings of free fluid, without obvious adnexal mass, unfortunately, she did not .   I did leave a voicemail, with return precautions, recommending urgent follow-up.          DISPOSITION AND DISCUSSIONS      Discussion of management with other HP or appropriate source(s): None     Escalation of care considered, and ultimately not performed:  the patient elected to decline an escalation in care.        FINAL DIAGNOSIS  1. Lower abdominal pain         Dave MONTEZ ([place]), am scribing for, and in the presence of, Tomi Lagunas M.D..    Electronically signed by: Dave Hickman ([place]), 2025    Tomi MONTEZ M.D. personally performed the services described in this documentation, as scribed by Dave Hickman in my presence, and it is both accurate and complete.      The note accurately reflects work and decisions made by me.  Tomi Lagunas M.D.  2025  9:34 PM

## 2025-05-16 ENCOUNTER — RESULTS FOLLOW-UP (OUTPATIENT)
Dept: OBGYN | Facility: MEDICAL CENTER | Age: 40
End: 2025-05-16

## 2025-06-10 PROCEDURE — RXMED WILLOW AMBULATORY MEDICATION CHARGE

## 2025-06-11 ENCOUNTER — HOSPITAL ENCOUNTER (OUTPATIENT)
Dept: LAB | Facility: MEDICAL CENTER | Age: 40
End: 2025-06-11
Payer: COMMERCIAL

## 2025-06-11 ENCOUNTER — PHARMACY VISIT (OUTPATIENT)
Dept: PHARMACY | Facility: MEDICAL CENTER | Age: 40
End: 2025-06-11
Payer: COMMERCIAL

## 2025-06-11 DIAGNOSIS — O36.80X0 PREGNANCY WITH UNCERTAIN FETAL VIABILITY, SINGLE OR UNSPECIFIED FETUS: ICD-10-CM

## 2025-06-11 LAB — B-HCG SERPL-ACNC: <1 MIU/ML (ref 0–5)

## 2025-06-11 PROCEDURE — 84702 CHORIONIC GONADOTROPIN TEST: CPT

## 2025-06-11 PROCEDURE — 36415 COLL VENOUS BLD VENIPUNCTURE: CPT

## 2025-06-15 PROCEDURE — RXMED WILLOW AMBULATORY MEDICATION CHARGE

## 2025-06-17 ENCOUNTER — PHARMACY VISIT (OUTPATIENT)
Dept: PHARMACY | Facility: MEDICAL CENTER | Age: 40
End: 2025-06-17
Payer: COMMERCIAL

## 2025-06-20 ENCOUNTER — PHARMACY VISIT (OUTPATIENT)
Dept: PHARMACY | Facility: MEDICAL CENTER | Age: 40
End: 2025-06-20
Payer: COMMERCIAL

## 2025-06-20 PROCEDURE — RXMED WILLOW AMBULATORY MEDICATION CHARGE: Performed by: PHYSICIAN ASSISTANT

## 2025-06-20 RX ORDER — CYCLOBENZAPRINE HCL 10 MG
TABLET ORAL
Qty: 30 TABLET | Refills: 0 | OUTPATIENT
Start: 2025-06-20

## 2025-06-20 RX ORDER — METHYLPREDNISOLONE 4 MG/1
TABLET ORAL
Qty: 21 TABLET | Refills: 0 | OUTPATIENT
Start: 2025-06-20

## 2025-06-20 RX ORDER — MELOXICAM 7.5 MG/1
TABLET ORAL
Qty: 30 TABLET | Refills: 0 | OUTPATIENT
Start: 2025-06-20

## 2025-11-03 ENCOUNTER — APPOINTMENT (OUTPATIENT)
Dept: MEDICAL GROUP | Facility: IMAGING CENTER | Age: 40
End: 2025-11-03
Payer: COMMERCIAL

## (undated) DEVICE — TRAY FOLEY CATHETER STATLOCK 16FR SURESTEP  (10EA/CA)

## (undated) DEVICE — SODIUM CHL IRRIGATION 0.9% 1000ML (12EA/CA)

## (undated) DEVICE — LACTATED RINGERS INJ 1000 ML - (14EA/CA 60CA/PF)

## (undated) DEVICE — Device

## (undated) DEVICE — CANISTER SUCTION RIGID RED 1500CC (40EA/CA)

## (undated) DEVICE — SET LEADWIRE 5 LEAD BEDSIDE DISPOSABLE ECG (1SET OF 5/EA)

## (undated) DEVICE — GOWN WARMING STANDARD FLEX - (30/CA)

## (undated) DEVICE — MASK OXYGEN VNYL ADLT MED CONC WITH 7 FOOT TUBING  - (50EA/CA)

## (undated) DEVICE — SPECIMEN PLASTIC CONVERTOR - (10/CA)

## (undated) DEVICE — TUBE E-T HI-LO CUFF 7.0MM (10EA/PK)

## (undated) DEVICE — SET TUBING AQUILEX OUT-FLOW MYOSURE (10EA/BX)

## (undated) DEVICE — HEMOSTAT ABSORBABLE POWDER SURGICEL 3G (5EA/BX)

## (undated) DEVICE — SET TUBING AQUILEX IN-FLOW MYOSURE (10EA/BX)

## (undated) DEVICE — SPONGE GAUZESTER. 2X2 4-PL - (2/PK 50PK/BX 30BX/CS)

## (undated) DEVICE — CANNULA O2 COMFORT SOFT EAR ADULT 7 FT TUBING (50/CA)

## (undated) DEVICE — TUBING CLEARLINK DUO-VENT - C-FLO (48EA/CA)

## (undated) DEVICE — SET TUBING PNEUMOCLEAR HIGH FLOW SMOKE EVACUATION (10EA/BX)

## (undated) DEVICE — APPLICATOR ENDOSCOPIC SURGICEL (5EA/BX)

## (undated) DEVICE — SENSOR OXIMETER ADULT SPO2 RD SET (20EA/BX)

## (undated) DEVICE — SET IRRIGATION CYSTOSCOPY TUBE L80 IN (20EA/CA)

## (undated) DEVICE — CANISTER SUCTION 3000ML MECHANICAL FILTER AUTO SHUTOFF MEDI-VAC NONSTERILE LF DISP  (40EA/CA)

## (undated) DEVICE — SLEEVE VASO CALF MED - (10PR/CA)

## (undated) DEVICE — DRAPESURG STERI-DRAPE LONG - (10/BX 4BX/CA)

## (undated) DEVICE — TUBE CONNECTING SUCTION - CLEAR PLASTIC STERILE 72 IN (50EA/CA)

## (undated) DEVICE — UTERINE MANIP RUMI 6.7X8 - (5/BX)

## (undated) DEVICE — GLOVE BIOGEL PI INDICATOR SZ 7.0 SURGICAL PF LF - (50/BX 4BX/CA)

## (undated) DEVICE — SODIUM CHL. IRRIGATION 0.9% 3000ML (4EA/CA 65CA/PF)

## (undated) DEVICE — STERI STRIP COMPOUND BENZOIN - TINCTURE 0.6ML WITH APPLICATOR (40EA/BX)

## (undated) DEVICE — WATER IRRIGATION STERILE 1000ML (12EA/CA)

## (undated) DEVICE — JELLY SURGILUBE STERILE TUBE 4.25 OZ (1/EA)

## (undated) DEVICE — SUTURE 4-0 MONOCRYL PLUS PS-2 - 27 INCH (36/BX)

## (undated) DEVICE — GLOVE BIOGEL SZ 6.5 SURGICAL PF LTX (50PR/BX 4BX/CA)

## (undated) DEVICE — SUCTION INSTRUMENT YANKAUER BULBOUS TIP W/O VENT (50EA/CA)

## (undated) DEVICE — TROCAR 5X100 BLADED ADVANCE - FIXATION (6/BX)

## (undated) DEVICE — GLOVE BIOGEL INDICATOR SZ 7SURGICAL PF LTX - (50/BX 4BX/CA)

## (undated) DEVICE — SUTURE 0 VICRYL PLUS CT-2 - 27 INCH (36/BX)

## (undated) DEVICE — SUTURE GENERAL

## (undated) DEVICE — GLOVE SZ 6.5 BIOGEL PI MICRO - PF LF (50PR/BX)

## (undated) DEVICE — TROCAR LAPSCP 100MM 12MM NTHRD - (6/BX)

## (undated) DEVICE — HANDPIECE 10FT INTPLS SCT PLS IRRIGATION HAND CONTROL SET (6/PK)

## (undated) DEVICE — KIT  I.V. START (100EA/CA)

## (undated) DEVICE — SYSTEM CLEARIFY VISUALIZATION (10EA/PK)

## (undated) DEVICE — PAD SANITARY 11IN MAXI IND WRAPPED  (12EA/PK 24PK/CA)

## (undated) DEVICE — TOWEL STOP TIMEOUT SAFETY FLAG (40EA/CA)

## (undated) DEVICE — CLOSURE SKIN STRIP 1/2 X 4 IN - (STERI STRIP) (50/BX 4BX/CA)

## (undated) DEVICE — DRESSING TRANSPARENT FILM TEGADERM 2.375 X 2.75"  (100EA/BX)"